# Patient Record
Sex: MALE | Race: OTHER | HISPANIC OR LATINO
[De-identification: names, ages, dates, MRNs, and addresses within clinical notes are randomized per-mention and may not be internally consistent; named-entity substitution may affect disease eponyms.]

---

## 2017-08-23 ENCOUNTER — RECORD ABSTRACTING (OUTPATIENT)
Age: 62
End: 2017-08-23

## 2017-08-23 PROBLEM — Z00.00 ENCOUNTER FOR PREVENTIVE HEALTH EXAMINATION: Status: ACTIVE | Noted: 2017-08-23

## 2017-08-24 ENCOUNTER — INPATIENT (INPATIENT)
Facility: HOSPITAL | Age: 62
LOS: 3 days | Discharge: HOME | DRG: 147 | End: 2017-08-28
Attending: OTOLARYNGOLOGY | Admitting: OTOLARYNGOLOGY
Payer: COMMERCIAL

## 2017-08-24 VITALS
RESPIRATION RATE: 18 BRPM | TEMPERATURE: 99 F | SYSTOLIC BLOOD PRESSURE: 135 MMHG | OXYGEN SATURATION: 100 % | HEART RATE: 85 BPM | DIASTOLIC BLOOD PRESSURE: 89 MMHG

## 2017-08-24 LAB
ALBUMIN SERPL ELPH-MCNC: 4.4 G/DL — SIGNIFICANT CHANGE UP (ref 3.3–5)
ALP SERPL-CCNC: 128 U/L — HIGH (ref 40–120)
ALT FLD-CCNC: 12 U/L — SIGNIFICANT CHANGE UP (ref 10–45)
ANION GAP SERPL CALC-SCNC: 14 MMOL/L — SIGNIFICANT CHANGE UP (ref 5–17)
AST SERPL-CCNC: 16 U/L — SIGNIFICANT CHANGE UP (ref 10–40)
BASOPHILS NFR BLD AUTO: 0.4 % — SIGNIFICANT CHANGE UP (ref 0–2)
BILIRUB SERPL-MCNC: 0.4 MG/DL — SIGNIFICANT CHANGE UP (ref 0.2–1.2)
BUN SERPL-MCNC: 17 MG/DL — SIGNIFICANT CHANGE UP (ref 7–23)
CALCIUM SERPL-MCNC: 9.6 MG/DL — SIGNIFICANT CHANGE UP (ref 8.4–10.5)
CHLORIDE SERPL-SCNC: 102 MMOL/L — SIGNIFICANT CHANGE UP (ref 96–108)
CO2 SERPL-SCNC: 25 MMOL/L — SIGNIFICANT CHANGE UP (ref 22–31)
CREAT SERPL-MCNC: 0.9 MG/DL — SIGNIFICANT CHANGE UP (ref 0.5–1.3)
EOSINOPHIL NFR BLD AUTO: 5 % — SIGNIFICANT CHANGE UP (ref 0–6)
GLUCOSE SERPL-MCNC: 123 MG/DL — HIGH (ref 70–99)
HCT VFR BLD CALC: 45.5 % — SIGNIFICANT CHANGE UP (ref 39–50)
HGB BLD-MCNC: 15.4 G/DL — SIGNIFICANT CHANGE UP (ref 13–17)
INR BLD: 1.26 — HIGH (ref 0.88–1.16)
LYMPHOCYTES # BLD AUTO: 20.3 % — SIGNIFICANT CHANGE UP (ref 13–44)
MAGNESIUM SERPL-MCNC: 2.1 MG/DL — SIGNIFICANT CHANGE UP (ref 1.6–2.6)
MCHC RBC-ENTMCNC: 30.4 PG — SIGNIFICANT CHANGE UP (ref 27–34)
MCHC RBC-ENTMCNC: 33.8 G/DL — SIGNIFICANT CHANGE UP (ref 32–36)
MCV RBC AUTO: 89.7 FL — SIGNIFICANT CHANGE UP (ref 80–100)
MONOCYTES NFR BLD AUTO: 6.2 % — SIGNIFICANT CHANGE UP (ref 2–14)
NEUTROPHILS NFR BLD AUTO: 68.1 % — SIGNIFICANT CHANGE UP (ref 43–77)
PHOSPHATE SERPL-MCNC: 3.6 MG/DL — SIGNIFICANT CHANGE UP (ref 2.5–4.5)
PLATELET # BLD AUTO: 220 K/UL — SIGNIFICANT CHANGE UP (ref 150–400)
POTASSIUM SERPL-MCNC: 4.1 MMOL/L — SIGNIFICANT CHANGE UP (ref 3.5–5.3)
POTASSIUM SERPL-SCNC: 4.1 MMOL/L — SIGNIFICANT CHANGE UP (ref 3.5–5.3)
PROT SERPL-MCNC: 8 G/DL — SIGNIFICANT CHANGE UP (ref 6–8.3)
PROTHROM AB SERPL-ACNC: 14 SEC — HIGH (ref 9.8–12.7)
RBC # BLD: 5.07 M/UL — SIGNIFICANT CHANGE UP (ref 4.2–5.8)
RBC # FLD: 13.7 % — SIGNIFICANT CHANGE UP (ref 10.3–16.9)
SODIUM SERPL-SCNC: 141 MMOL/L — SIGNIFICANT CHANGE UP (ref 135–145)
WBC # BLD: 9.3 K/UL — SIGNIFICANT CHANGE UP (ref 3.8–10.5)
WBC # FLD AUTO: 9.3 K/UL — SIGNIFICANT CHANGE UP (ref 3.8–10.5)

## 2017-08-24 PROCEDURE — 71010: CPT | Mod: 26

## 2017-08-24 PROCEDURE — 99254 IP/OBS CNSLTJ NEW/EST MOD 60: CPT | Mod: GC

## 2017-08-24 PROCEDURE — 93010 ELECTROCARDIOGRAM REPORT: CPT

## 2017-08-24 RX ORDER — ONDANSETRON 8 MG/1
4 TABLET, FILM COATED ORAL EVERY 6 HOURS
Qty: 0 | Refills: 0 | Status: DISCONTINUED | OUTPATIENT
Start: 2017-08-24 | End: 2017-08-28

## 2017-08-24 RX ORDER — ACETAMINOPHEN 500 MG
650 TABLET ORAL EVERY 6 HOURS
Qty: 0 | Refills: 0 | Status: DISCONTINUED | OUTPATIENT
Start: 2017-08-24 | End: 2017-08-25

## 2017-08-24 RX ORDER — NICOTINE POLACRILEX 2 MG
1 GUM BUCCAL DAILY
Qty: 0 | Refills: 0 | Status: DISCONTINUED | OUTPATIENT
Start: 2017-08-24 | End: 2017-08-24

## 2017-08-24 RX ORDER — SODIUM CHLORIDE 9 MG/ML
1000 INJECTION, SOLUTION INTRAVENOUS
Qty: 0 | Refills: 0 | Status: DISCONTINUED | OUTPATIENT
Start: 2017-08-24 | End: 2017-08-26

## 2017-08-24 RX ORDER — NICOTINE POLACRILEX 2 MG
1 GUM BUCCAL DAILY
Qty: 0 | Refills: 0 | Status: DISCONTINUED | OUTPATIENT
Start: 2017-08-24 | End: 2017-08-28

## 2017-08-24 RX ORDER — HEPARIN SODIUM 5000 [USP'U]/ML
5000 INJECTION INTRAVENOUS; SUBCUTANEOUS EVERY 8 HOURS
Qty: 0 | Refills: 0 | Status: DISCONTINUED | OUTPATIENT
Start: 2017-08-24 | End: 2017-08-24

## 2017-08-24 RX ADMIN — Medication 1 PATCH: at 19:08

## 2017-08-24 NOTE — H&P ADULT - ATTENDING COMMENTS
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  He has narrow glottic inlet, so plan to do microlaryngoscopy and biopsies to map primary and open up glottic inlet by debulking tumor.  Diagnosis already established by outside open biopsy of left upper jugular LN 2 weeks ago.  He is also to have PEG since not able to drink much.  Treatment recommendations will depend on operative findings and CT neck.

## 2017-08-24 NOTE — H&P ADULT - HISTORY OF PRESENT ILLNESS
HPI: 62M who initially presented with throat pain for about a month which he attributed to a viral pharyngitis at first.  His symptoms then progressed to persistent throat pain, dysphagia, decreased PO tolerance, dysphonia, and left sided otalgia.  He also noticed a left sided neck mass which prompted his to go to [facility].  He underwent an incisional biopsy at outside hospital which was positive for SCC and subsequent PET CT which showed left piriform sinus mass w/ increased uptake as well as increased uptake in left nasopharynx, left palatine tonsil and left cervical LN.  At this time he was referred to Dr. Martinez who recommended inpatient admission, DL/biopsy, IV hydration and possibel PEG.  Since his biopsy he endorses worsening PO tolerance and now daily otalgia.  No dyspnea/SOB, cough, or hemoptysis.       Allergies    No Known Allergies    Intolerances      SOCIAL HISTORY:  Tobacco History: 4 PPD smoker x40 years  ETOH Use: 6-pack of beers/day, does not remember the last time he has not had a drink, last drink today at 10am    FAMILY HISTORY:    Pain medications/Neuro:  acetaminophen    Suspension. 650 milliGRAM(s) Oral every 6 hours PRN  ondansetron Injectable 4 milliGRAM(s) IV Push every 6 hours PRN  LORazepam    IVPB 2 milliGRAM(s) IV Intermittent every 4 hours PRN      IV fluids:  lactated ringers. 1000 milliLiter(s) IV Continuous <Continuous>          All other standing medications:   nicotine - 21 mG/24Hr(s) Patch 1 patch Transdermal daily      All other PRN medications:      Vital Signs Last 24 Hrs  T(C): 37.1 (24 Aug 2017 17:07), Max: 37.1 (24 Aug 2017 17:07)  T(F): 98.7 (24 Aug 2017 17:07), Max: 98.7 (24 Aug 2017 17:07)  HR: 74 (24 Aug 2017 17:07) (74 - 85)  BP: 113/77 (24 Aug 2017 17:07) (113/77 - 135/89)  BP(mean): --  RR: 18 (24 Aug 2017 17:07) (18 - 18)  SpO2: 97% (24 Aug 2017 17:07) (97% - 100%)    LABS: pending          PHYSICAL EXAM:  Gen: NAD, alert and responsive, pleasant, no evidence of tremors  Resp: non-labored breathing on RA, no stridor/tachypnea/retractions, +hoarseness but phonation intact, tolerating secretions  Nose: clear anteriorly  OC/OP: healthy appearing mucoous membranes  Neck: supple, trachea midline, left neck with  incision in level II, c/d/i, with steri-strips, no other palpable LN, no overlying skin changes, non-tender      RADIOLOGY & ADDITIONAL STUDIES: reveiwed      Assessment/Plan:  62y Male w/ 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 Juan Davis ENT at [phone] with any questions/concerns. HPI: 62M who initially presented with throat pain for about a month which he attributed to a viral pharyngitis at first.  His symptoms then progressed to persistent throat pain, dysphagia, decreased PO tolerance, dysphonia, and left sided otalgia.  He also noticed a left sided neck mass which prompted his to go to [facility].  He underwent an incisional biopsy at outside hospital which was positive for SCC and subsequent PET CT which showed left piriform sinus mass w/ increased uptake as well as increased uptake in left nasopharynx, left palatine tonsil and left cervical LN.  At this time he was referred to Dr. Martinez who recommended inpatient admission, DL/biopsy, IV hydration and possibel PEG.  Since his biopsy he endorses worsening PO tolerance and now daily otalgia.  No dyspnea/SOB, cough, or hemoptysis.       Allergies    No Known Allergies    Intolerances      SOCIAL HISTORY:  Tobacco History: 4 PPD smoker x40 years  ETOH Use: 6-pack of beers/day, does not remember the last time he has not had a drink, last drink today at 10am    FAMILY HISTORY:    Pain medications/Neuro:  acetaminophen    Suspension. 650 milliGRAM(s) Oral every 6 hours PRN  ondansetron Injectable 4 milliGRAM(s) IV Push every 6 hours PRN  LORazepam    IVPB 2 milliGRAM(s) IV Intermittent every 4 hours PRN      IV fluids:  lactated ringers. 1000 milliLiter(s) IV Continuous <Continuous>          All other standing medications:   nicotine - 21 mG/24Hr(s) Patch 1 patch Transdermal daily      All other PRN medications:      Vital Signs Last 24 Hrs  T(C): 37.1 (24 Aug 2017 17:07), Max: 37.1 (24 Aug 2017 17:07)  T(F): 98.7 (24 Aug 2017 17:07), Max: 98.7 (24 Aug 2017 17:07)  HR: 74 (24 Aug 2017 17:07) (74 - 85)  BP: 113/77 (24 Aug 2017 17:07) (113/77 - 135/89)  BP(mean): --  RR: 18 (24 Aug 2017 17:07) (18 - 18)  SpO2: 97% (24 Aug 2017 17:07) (97% - 100%)    LABS: pending          PHYSICAL EXAM:  Gen: NAD, alert and responsive, pleasant, no evidence of tremors  Resp: non-labored breathing on RA, no stridor/tachypnea/retractions, +hoarseness but phonation intact, tolerating secretions  Nose: clear anteriorly  OC/OP: no visible tonsillar tissue, upper dentures otherwise healthy appearing mucous membranes, symmetric palatal rise, posterior OP wall visible  Neck: supple, trachea midline, left neck with  incision in level II, c/d/i, with steri-strips, two discrete left sided level IIa and IIb LN, no other palpable LAD, no overlying skin changes, non-tender    LARYNGOSCOPY EXAM:     -Verbal consent was obtained from patient prior to procedure.    Indication:    Anesthesia: Afrin and lidocaine spray was applied to the nasal cavities.    Flexible laryngoscopy was performed and revealed the following:    -- Nasopharynx had no mass or exudate.    -- Base of tongue was symmetric and not enlarged.    -- Vallecula was clear    -- Epiglottis    --Left piriform mass extending to L FVF, TVF and proximal AE fold    --Left TVC non-mobile, paramedian position, RTV fully mobile    -- Arytenoids both without edema and erythema     -- Post cricoid area was clear.    -- Interarytenoid edema was absent    The patient tolerated the procedure well.      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 Juan Davis ENT at [phone] with any questions/concerns. HPI: Mr. Larose is a 62M who initially presented with throat pain for about a month which he attributed to a viral pharyngitis at first.  His symptoms then progressed to persistent throat pain, dysphagia, decreased PO tolerance, dysphonia, and left sided otalgia.  He also noticed a left sided neck mass which prompted his to go a general surgeon.  He underwent an incisional biopsy at outside hospital which was positive for metastatic SCC.  Subsequent MRI neck and a  PET CT showed left piriform sinus mass w/ increased uptake as well as increased uptake in left nasopharynx, left palatine tonsil and left cervical LN.    At this time he was referred to Dr. Martinez for increasing difficulty swallowing.  Daughter reported that he was barely able to drink liquids in few days prior to admission.  Direct inpatient admission was arranged for IV hydration and PEG.  Since his neck biopsy, he endorses worsening PO tolerance and now daily otalgia.    No dyspnea/SOB, cough, or hemoptysis.       Allergies  No Known Allergies    SOCIAL HISTORY:  Tobacco History: 4 PPD smoker x40 years  ETOH Use: 6-pack of beers/day, does not remember the last time he has not had a drink, last drink today at 10am    FAMILY HISTORY:  Throat cancer in mother    MEDICATIONS  Pain medications/Neuro:  acetaminophen    Suspension. 650 milliGRAM(s) Oral every 6 hours PRN  ondansetron Injectable 4 milliGRAM(s) IV Push every 6 hours PRN  LORazepam    IVPB 2 milliGRAM(s) IV Intermittent every 4 hours PRN      IV fluids:  lactated ringers. 1000 milliLiter(s) IV Continuous <Continuous>          All other standing medications:   nicotine - 21 mG/24Hr(s) Patch 1 patch Transdermal daily      All other PRN medications:      Vital Signs Last 24 Hrs  T(C): 37.1 (24 Aug 2017 17:07), Max: 37.1 (24 Aug 2017 17:07)  T(F): 98.7 (24 Aug 2017 17:07), Max: 98.7 (24 Aug 2017 17:07)  HR: 74 (24 Aug 2017 17:07) (74 - 85)  BP: 113/77 (24 Aug 2017 17:07) (113/77 - 135/89)  BP(mean): --  RR: 18 (24 Aug 2017 17:07) (18 - 18)  SpO2: 97% (24 Aug 2017 17:07) (97% - 100%)    LABS: pending          PHYSICAL EXAM:  Gen: NAD, alert and responsive, pleasant, no evidence of tremors  Resp: non-labored breathing on RA, no stridor/tachypnea/retractions, +hoarseness but phonation intact, tolerating secretions  Nose: clear anteriorly  OC/OP: no visible tonsillar tissue, upper dentures otherwise healthy appearing mucous membranes, symmetric palatal rise, posterior OP wall visible  Neck: supple, trachea midline, left neck with  incision in level II, c/d/i, with steri-strips, two discrete left sided level IIa and IIb LN, no other palpable LAD, no overlying skin changes, non-tender    LARYNGOSCOPY EXAM:     -Verbal consent was obtained from patient prior to procedure.    Indication:    Anesthesia: Afrin and lidocaine spray was applied to the nasal cavities.    Flexible laryngoscopy was performed and revealed the following:    -- Nasopharynx had no mass or exudate.    -- Base of tongue was symmetric and not enlarged.    -- Vallecula was clear    -- Epiglottis    --Left piriform mass extending to L FVF, TVF and proximal AE fold    --Left TVC non-mobile, paramedian position, RTV fully mobile    -- Arytenoids both without edema and erythema     -- Post cricoid area was clear.    -- Interarytenoid edema was absent    The patient tolerated the procedure well.      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 Juan Davis ENT at [phone] with any questions/concerns.

## 2017-08-24 NOTE — CONSULT NOTE ADULT - SUBJECTIVE AND OBJECTIVE BOX
Patient is a 62y old  Male who presents with a chief complaint of trouble swallowing.     Mr. Larose is a 62 yr old male daily smoker and alcohol abuser with PMHx metastatic SCC of the neck who presents with trouble swallowing. Pt says 3 months ago he began to have a sore throat with some trouble swallowing. He thought it was a regular cold but it got worse. He then began to feel a mass on the left side of his neck, which he also attributed to a virus. Eventually he did seek treatment and had a biopsy done. The biopsy showed metastatic SCC of the neck (the biopsy was of a lymph node). He also had a MRI and PET which showed tumor burden in his neck. Pt c/p trouble swallowing solids and liquids. He can walk an unlimited number of upstairs w/o symptoms. He has no nightly cardio/pulm sx except for mild snoring. He was never hospitalized for his drinking or w/d.   Pt denies fever chills sob cp n/v/d.     INTERVAL HPI/OVERNIGHT EVENTS:  T(C): 37 (08-24-17 @ 14:46), Max: 37 (08-24-17 @ 14:46)  HR: 85 (08-24-17 @ 14:46) (85 - 85)  BP: 135/89 (08-24-17 @ 14:46) (135/89 - 135/89)  RR: 18 (08-24-17 @ 14:46) (18 - 18)  SpO2: 100% (08-24-17 @ 14:46) (100% - 100%)  Wt(kg): --  I&O's Summary      PAST MEDICAL & SURGICAL HISTORY:  SCC of neck  etoh abuse      SOCIAL HISTORY  Alcohol: daily drinker -  pack of beer for years  Tobacco: daily smoker formerly 4 packs per day for 30 yrs now down to 1 pack per day)  Illicit substance use: n/a      FAMILY HISTORY:    mom: throat cancer    MEDICATIONS  (STANDING):  heparin  Injectable 5000 Unit(s) SubCutaneous every 8 hours  lactated ringers. 1000 milliLiter(s) (75 mL/Hr) IV Continuous <Continuous>  nicotine -  14 mG/24Hr(s) Patch 1 Patch Transdermal daily    MEDICATIONS  (PRN):  acetaminophen    Suspension. 650 milliGRAM(s) Oral every 6 hours PRN Mild Pain (1 - 3)  ondansetron Injectable 4 milliGRAM(s) IV Push every 6 hours PRN Nausea      REVIEW OF SYSTEMS:  CONSTITUTIONAL: No fever, weight loss, or fatigue  EYES: No eye pain, visual disturbances, or discharge  ENMT:  No difficulty hearing, tinnitus, vertigo; + throat pain  NECK: No pain or stiffness  RESPIRATORY: No cough, wheezing, chills or hemoptysis; No shortness of breath  CARDIOVASCULAR: No chest pain, palpitations, dizziness, or leg swelling  GASTROINTESTINAL: No abdominal or epigastric pain. No nausea, vomiting, or hematemesis; No diarrhea or constipation. No melena or hematochezia.  GENITOURINARY: No dysuria, frequency, hematuria, or incontinence  NEUROLOGICAL: No headaches, memory loss, loss of strength, numbness, or tremors  SKIN: No itching, burning, rashes, or lesions   LYMPH NODES: enlarged gland on left submandibular area  ENDOCRINE: No heat or cold intolerance; No hair loss  MUSCULOSKELETAL: No joint pain or swelling; No muscle, back, or extremity pain  PSYCHIATRIC: + anxiety, No depression, mood swings, or difficulty sleeping  HEME/LYMPH: No easy bruising, or bleeding gums  ALLERY AND IMMUNOLOGIC: No hives or eczema    RADIOLOGY & ADDITIONAL TESTS:    Imaging Personally Reviewed:  [x ] YES  [ ] NO    Consultant(s) Notes Reviewed:  [ ] YES  [ ] NO    PHYSICAL EXAM:  GENERAL: NAD, well-groomed, well-developed. anxious.   HEAD:  Atraumatic, Normocephalic  EYES: EOMI, PERRLA, conjunctiva and sclera clear  ENMT: No tonsillar erythema, exudates, or enlargement; Moist mucous membranes, Good dentition, No lesions.  NECK: hard palpable node left submandibular area s/p biopsy with dressing seen.   NERVOUS SYSTEM:  Alert & Oriented X3, Good concentration; Motor Strength 5/5 B/L upper and lower extremities; DTRs 2+ intact and symmetric    CIWA score 1    CHEST/LUNG: Clear to percussion bilaterally; No rales, rhonchi, wheezing, or rubs  HEART: Regular rate and rhythm; No murmurs, rubs, or gallops  ABDOMEN: Soft, Nontender, Nondistended; Bowel sounds present  EXTREMITIES:  2+ Peripheral Pulses, No clubbing, cyanosis, or edema  LYMPH: hard palpable node left submandibular area s/p biopsy with dressing seen.   SKIN: No rashes or lesions    a/p: 62 yr old male daily smoker and alcohol abuser with PMHx metastatic SCC of the neck who p/w worsening dysphagia and odynophagia likely from increased tumor burden. medicine consulted for pre op clearance and etoh w/d.     preop - pt to go to [facility] tmrw [facility] with ENT and gen surg for direct laryngoscopy with biopsies and poss troch and poss PEG. to go under general anesthesia. no cardiac disease. METS > 4 and RCRI 0.   low risk patient for low risk procedure.       etoh w/d - pt is a daily drinker (last drink 10 am today) with no hx of hospitalizations or DTs. currently he is not showing s/s of etoh w/d and his CIWA is 1. We expect that as his etoh blood level drops he will show more symptoms. therefore, we recommend:  - ativan 2 mg prn ciwa > 8  - if need to give ativan consistently can start librium    medicine will follow    case d/w Dr. Umanzor Patient is a 62y old  Male who presents with a chief complaint of trouble swallowing.     Mr. Larose is a 62 yr old male daily smoker and alcohol abuser with PMHx metastatic SCC of the neck who presents with trouble swallowing. Pt says 3 months ago he began to have a sore throat with some trouble swallowing. He thought it was a regular cold but it got worse. He then began to feel a mass on the left side of his neck, which he also attributed to a virus. Eventually he did seek treatment and had a biopsy done. The biopsy showed metastatic SCC of the neck (the biopsy was of a lymph node). He also had a MRI and PET which showed tumor burden in his neck. Pt c/p trouble swallowing solids and liquids. He can walk an unlimited number of upstairs w/o symptoms. He has no nightly cardio/pulm sx except for mild snoring. He was never hospitalized for his drinking or w/d.   Pt denies fever chills sob cp n/v/d.     INTERVAL HPI/OVERNIGHT EVENTS:  T(C): 37 (08-24-17 @ 14:46), Max: 37 (08-24-17 @ 14:46)  HR: 85 (08-24-17 @ 14:46) (85 - 85)  BP: 135/89 (08-24-17 @ 14:46) (135/89 - 135/89)  RR: 18 (08-24-17 @ 14:46) (18 - 18)  SpO2: 100% (08-24-17 @ 14:46) (100% - 100%)  Wt(kg): --  I&O's Summary      PAST MEDICAL & SURGICAL HISTORY:  SCC of neck  etoh abuse      SOCIAL HISTORY  Alcohol: daily drinker -  pack of beer for years  Tobacco: daily smoker formerly 4 packs per day for 30 yrs now down to 1 pack per day)  Illicit substance use: n/a      FAMILY HISTORY:    mom: throat cancer    MEDICATIONS  (STANDING):  heparin  Injectable 5000 Unit(s) SubCutaneous every 8 hours  lactated ringers. 1000 milliLiter(s) (75 mL/Hr) IV Continuous <Continuous>  nicotine -  14 mG/24Hr(s) Patch 1 Patch Transdermal daily    MEDICATIONS  (PRN):  acetaminophen    Suspension. 650 milliGRAM(s) Oral every 6 hours PRN Mild Pain (1 - 3)  ondansetron Injectable 4 milliGRAM(s) IV Push every 6 hours PRN Nausea      REVIEW OF SYSTEMS:  CONSTITUTIONAL: No fever, weight loss, or fatigue  EYES: No eye pain, visual disturbances, or discharge  ENMT:  No difficulty hearing, tinnitus, vertigo; + throat pain  NECK: No pain or stiffness  RESPIRATORY: No cough, wheezing, chills or hemoptysis; No shortness of breath  CARDIOVASCULAR: No chest pain, palpitations, dizziness, or leg swelling  GASTROINTESTINAL: No abdominal or epigastric pain. No nausea, vomiting, or hematemesis; No diarrhea or constipation. No melena or hematochezia.  GENITOURINARY: No dysuria, frequency, hematuria, or incontinence  NEUROLOGICAL: No headaches, memory loss, loss of strength, numbness, or tremors  SKIN: No itching, burning, rashes, or lesions   LYMPH NODES: enlarged gland on left submandibular area  ENDOCRINE: No heat or cold intolerance; No hair loss  MUSCULOSKELETAL: No joint pain or swelling; No muscle, back, or extremity pain  PSYCHIATRIC: + anxiety, No depression, mood swings, or difficulty sleeping  HEME/LYMPH: No easy bruising, or bleeding gums  ALLERY AND IMMUNOLOGIC: No hives or eczema    RADIOLOGY & ADDITIONAL TESTS:    Imaging Personally Reviewed:  [x ] YES  [ ] NO    Consultant(s) Notes Reviewed:  [ ] YES  [ ] NO    PHYSICAL EXAM:  GENERAL: NAD, well-groomed, well-developed. anxious.   HEAD:  Atraumatic, Normocephalic  EYES: EOMI, PERRLA, conjunctiva and sclera clear  ENMT: No tonsillar erythema, exudates, or enlargement; Moist mucous membranes, Good dentition, No lesions.  NECK: hard palpable node left submandibular area s/p biopsy with dressing seen.   NERVOUS SYSTEM:  Alert & Oriented X3, Good concentration; Motor Strength 5/5 B/L upper and lower extremities; DTRs 2+ intact and symmetric    CIWA score 1    CHEST/LUNG: Clear to percussion bilaterally; No rales, rhonchi, wheezing, or rubs  HEART: Regular rate and rhythm; No murmurs, rubs, or gallops  ABDOMEN: Soft, Nontender, Nondistended; Bowel sounds present  EXTREMITIES:  2+ Peripheral Pulses, No clubbing, cyanosis, or edema  LYMPH: hard palpable node left submandibular area s/p biopsy with dressing seen.   SKIN: No rashes or lesions    a/p: 62 yr old male daily smoker and alcohol abuser with PMHx metastatic SCC of the neck who p/w worsening dysphagia and odynophagia likely from increased tumor burden. medicine consulted for pre op clearance and etoh w/d.     preop - pt to go to OR tmrw PM with ENT and gen surg for direct laryngoscopy with biopsies and poss troch and poss PEG. to go under general anesthesia. no cardiac disease. METS > 4 and RCRI 0.   low risk patient for low risk procedure.   - obtain CBC CMP and EKG pre op  - cxr done as o/p wnl  Pt is OK to proceed with surgery.       etoh w/d - pt is a daily drinker (last drink 10 am today) with no hx of hospitalizations or DTs. currently he is not showing s/s of etoh w/d and his CIWA is 1. We expect that as his etoh blood level drops (or after surgery) he may show more symptoms. therefore, we recommend:  - ativan 2 mg prn q4h for ciwa > 8 OR s/s of etoh w/d (anxiety, agitation)  - may consider starting librium post op    smoking hx - Nicotine patch 21 mg    medicine will follow    case d/w Dr. Umanzor

## 2017-08-25 ENCOUNTER — RESULT REVIEW (OUTPATIENT)
Age: 62
End: 2017-08-25

## 2017-08-25 DIAGNOSIS — F17.200 NICOTINE DEPENDENCE, UNSPECIFIED, UNCOMPLICATED: ICD-10-CM

## 2017-08-25 DIAGNOSIS — C79.9 SECONDARY MALIGNANT NEOPLASM OF UNSPECIFIED SITE: ICD-10-CM

## 2017-08-25 DIAGNOSIS — Z01.818 ENCOUNTER FOR OTHER PREPROCEDURAL EXAMINATION: ICD-10-CM

## 2017-08-25 DIAGNOSIS — F10.10 ALCOHOL ABUSE, UNCOMPLICATED: ICD-10-CM

## 2017-08-25 PROCEDURE — 99233 SBSQ HOSP IP/OBS HIGH 50: CPT

## 2017-08-25 PROCEDURE — 70491 CT SOFT TISSUE NECK W/DYE: CPT | Mod: 26

## 2017-08-25 PROCEDURE — 31536 LARYNGOSCOPY W/BX & OP SCOPE: CPT

## 2017-08-25 RX ORDER — BENZOCAINE AND MENTHOL 5; 1 G/100ML; G/100ML
1 LIQUID ORAL EVERY 4 HOURS
Qty: 0 | Refills: 0 | Status: DISCONTINUED | OUTPATIENT
Start: 2017-08-25 | End: 2017-08-28

## 2017-08-25 RX ORDER — FOLIC ACID 0.8 MG
1 TABLET ORAL DAILY
Qty: 0 | Refills: 0 | Status: DISCONTINUED | OUTPATIENT
Start: 2017-08-25 | End: 2017-08-28

## 2017-08-25 RX ORDER — ACETAMINOPHEN 500 MG
650 TABLET ORAL EVERY 6 HOURS
Qty: 0 | Refills: 0 | Status: DISCONTINUED | OUTPATIENT
Start: 2017-08-25 | End: 2017-08-28

## 2017-08-25 RX ORDER — HEPARIN SODIUM 5000 [USP'U]/ML
5000 INJECTION INTRAVENOUS; SUBCUTANEOUS EVERY 8 HOURS
Qty: 0 | Refills: 0 | Status: DISCONTINUED | OUTPATIENT
Start: 2017-08-26 | End: 2017-08-28

## 2017-08-25 RX ORDER — HYDROMORPHONE HYDROCHLORIDE 2 MG/ML
0.2 INJECTION INTRAMUSCULAR; INTRAVENOUS; SUBCUTANEOUS ONCE
Qty: 0 | Refills: 0 | Status: DISCONTINUED | OUTPATIENT
Start: 2017-08-25 | End: 2017-08-25

## 2017-08-25 RX ORDER — THIAMINE MONONITRATE (VIT B1) 100 MG
100 TABLET ORAL DAILY
Qty: 0 | Refills: 0 | Status: DISCONTINUED | OUTPATIENT
Start: 2017-08-25 | End: 2017-08-28

## 2017-08-25 RX ORDER — OXYCODONE HYDROCHLORIDE 5 MG/1
5 TABLET ORAL EVERY 4 HOURS
Qty: 0 | Refills: 0 | Status: DISCONTINUED | OUTPATIENT
Start: 2017-08-25 | End: 2017-08-25

## 2017-08-25 RX ORDER — SENNA PLUS 8.6 MG/1
10 TABLET ORAL
Qty: 0 | Refills: 0 | Status: DISCONTINUED | OUTPATIENT
Start: 2017-08-25 | End: 2017-08-28

## 2017-08-25 RX ORDER — HYDROMORPHONE HYDROCHLORIDE 2 MG/ML
0.5 INJECTION INTRAMUSCULAR; INTRAVENOUS; SUBCUTANEOUS EVERY 4 HOURS
Qty: 0 | Refills: 0 | Status: DISCONTINUED | OUTPATIENT
Start: 2017-08-25 | End: 2017-08-28

## 2017-08-25 RX ORDER — OXYCODONE HYDROCHLORIDE 5 MG/1
5 TABLET ORAL EVERY 4 HOURS
Qty: 0 | Refills: 0 | Status: DISCONTINUED | OUTPATIENT
Start: 2017-08-25 | End: 2017-08-28

## 2017-08-25 RX ADMIN — Medication 1 PATCH: at 23:23

## 2017-08-25 RX ADMIN — Medication 1 PATCH: at 23:24

## 2017-08-25 RX ADMIN — HYDROMORPHONE HYDROCHLORIDE 0.2 MILLIGRAM(S): 2 INJECTION INTRAMUSCULAR; INTRAVENOUS; SUBCUTANEOUS at 14:50

## 2017-08-25 NOTE — PROGRESS NOTE ADULT - SUBJECTIVE AND OBJECTIVE BOX
Patient is a 62y old  Male who presents with a chief complaint of     INTERVAL HPI/OVERNIGHT EVENTS:  Seen and examined by me this morning. Awaiting procedures. Minimally anxious and not showing any e/o WD. Family at bedside. Stated not wanting to have a tube on his neck. Minimal craving to smoke. Didn't use any ativan overnight.    Review of Systems: 12 point review of systems otherwise negative    MEDICATIONS  (STANDING):  lactated ringers. 1000 milliLiter(s) (75 mL/Hr) IV Continuous <Continuous>  nicotine - 21 mG/24Hr(s) Patch 1 patch Transdermal daily  thiamine 100 milliGRAM(s) Enteral Tube daily  folic acid 1 milliGRAM(s) Enteral Tube daily  multivitamin  Chewable 1 Tablet(s) Chew daily    MEDICATIONS  (PRN):  acetaminophen    Suspension. 650 milliGRAM(s) Oral every 6 hours PRN Mild Pain (1 - 3)  ondansetron Injectable 4 milliGRAM(s) IV Push every 6 hours PRN Nausea  LORazepam    IVPB 2 milliGRAM(s) IV Intermittent every 4 hours PRN Agitation/axiety, CIWA>8      Allergies    No Known Allergies    Intolerances          Vital Signs Last 24 Hrs  T(C): 37.1 (25 Aug 2017 16:30), Max: 37.1 (25 Aug 2017 16:30)  T(F): 98.7 (25 Aug 2017 16:30), Max: 98.7 (25 Aug 2017 16:30)  HR: 73 (25 Aug 2017 16:09) (64 - 78)  BP: 166/103 (25 Aug 2017 16:09) (124/79 - 166/103)  BP(mean): 97 (25 Aug 2017 01:06) (92 - 101)  RR: 17 (25 Aug 2017 16:09) (16 - 18)  SpO2: 96% (25 Aug 2017 16:09) (96% - 99%)  CAPILLARY BLOOD GLUCOSE          08- @ :  -   @ 07:00  --------------------------------------------------------  IN: 675 mL / OUT: 200 mL / NET: 475 mL     @ 07:  -   @ 18:28  --------------------------------------------------------  IN: 300 mL / OUT: 350 mL / NET: -50 mL        Physical Exam:    Daily Height in cm: 175.26 (25 Aug 2017 06:55)    Daily Weight in k.5 (25 Aug 2017 14:43)  General:  Well appearing, NAD, not cachetic, no tremors, minimally anxious  HEENT:  Nonicteric, PERRLA, hardening on left side of neck  CV:  RRR, no murmur, no JVD  Lungs:  CTA B/L, no wheezes, rales, rhonchi  Abdomen:  Soft, non-tender, no distended, positive BS, no hepatosplenomegaly  Extremities:  2+ pulses, no c/c, no edema  Skin:  Warm and dry, no rashes  :  No lira  Neuro:  AAOx3, non-focal, CN II-XII grossly intact  No Restraints    LABS:                        15.4   9.3   )-----------( 220      ( 24 Aug 2017 18:20 )             45.5     08-    141  |  102  |  17  ----------------------------<  123<H>  4.1   |  25  |  0.90    Ca    9.6      24 Aug 2017 18:19  Phos  3.6     08-  Mg     2.1     -24    TPro  8.0  /  Alb  4.4  /  TBili  0.4  /  DBili  x   /  AST  16  /  ALT  12  /  AlkPhos  128<H>  08-24    PT/INR - ( 24 Aug 2017 18:19 )   PT: 14.0 sec;   INR: 1.26                  RADIOLOGY & ADDITIONAL TESTS:    ---------------------------------------------------------------------------

## 2017-08-25 NOTE — BRIEF OPERATIVE NOTE - POST-OP DX
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

## 2017-08-25 NOTE — PROGRESS NOTE ADULT - SUBJECTIVE AND OBJECTIVE BOX
ENT Preop Note    Procedure: direct laryngoscopy, bronchoscopy, esophagoscopy with biopsy, possible tracheostomy  Indication: hyopharyngeal mass, +metastatic SCC of neck    A/P: 61 yo M with biopsy confirmed metastatic SCC of the neck with hyopharyngeal mass for above named procedure today.  -added on for OR today  -NPO, IVF  -preop labs, ekg, cxr reviewed (Cxr read pending)  -CTN with contrast today  -consent obtained and in chart  -anesthesia consent pending

## 2017-08-25 NOTE — DIETITIAN INITIAL EVALUATION ADULT. - NS AS NUTRI INTERV ENTERAL NUTRITION
As medically feasible s/p PEG placement, rec Jevity 1.2 - initiate at 30ml/hr, increasing by 10ml Q 4 hrs, to goal rate of 75ml/hr cont x24 hrs (2160kcal, 99g pro, 1458ml water).  Consider 600-950cc free water to best meet hydration needs.

## 2017-08-25 NOTE — PROGRESS NOTE ADULT - PROBLEM SELECTOR PLAN 1
RCRI: 0, Mets >4. Combined clinical and surgical risk is low. Low/intermediate risk procedures: PEG, direct laryngoscopy, bronchoscopy, esophagoscopy with biopsy, possible tracheostomy. RCRI: 0, Mets >4. Combined clinical and surgical risk is low. Low/intermediate risk procedures: PEG, direct laryngoscopy, bronchoscopy, esophagoscopy with biopsy, possible tracheostomy. Rec DVT PPx post-operatively with LMWH.

## 2017-08-25 NOTE — PROGRESS NOTE ADULT - SUBJECTIVE AND OBJECTIVE BOX
Post-op check:    s/p direct laryngoscopy and biopsy    Patient tolerated the procedure well, extubated in OR without difficulty.  Saturating well in PACU, pain controlled.  No events post-operatively.    PE:  Gen: NAD, sleepy but responsive and appropriate  Resp: non-labored breathing on RA, no stridor, tachypnea or retractions  Nose: clear anteriorly  OC/OP: no gross bleeding  Neck: steri-strips removed in OR, palpable left level II nodes unchanged    A/P:  62M s/p DL and biopsy by ENT and PEG placed by GI earlier this AM.  No issues post-operatively, pain controlled, good hemostasis no respiratory difficulties  -transfer back to SDU after PACU for DT monitoring  -Ativan PRN as per medicine recs  -pain control, prn nausea  -may administer meds via PEG but no feeds until 24 hours post-procedure  -continue Nicotine patch  -cepachol drops PRN  -SCDs, oob ad aparna once anesthesia has worn off  -Call ENT with questions

## 2017-08-25 NOTE — DIETITIAN INITIAL EVALUATION ADULT. - NS FNS WEIGHT USED FOR CALC
ideal/Wt (8/25) 68.5kg; BMI 22.3; IBW 72.7kg; %IBW 94% admission/Wt (8/25) 68.5kg; BMI 22.3; IBW 72.7kg; %IBW 94%

## 2017-08-25 NOTE — PROGRESS NOTE ADULT - SUBJECTIVE AND OBJECTIVE BOX
ENT [facility] DAILY PROGRESS NOTE    S Pt seen and examined. No acute overnight events. NPO since MN last night in preparation for the OR today. No dyspnea.          Allergies    No Known Allergies    Intolerances        MEDICATIONS:  Antiinfectives:     IV fluids:  lactated ringers. 1000 milliLiter(s) IV Continuous <Continuous>    Hematologic/Anticoagulation:    Pain medications/Neuro:  acetaminophen    Suspension. 650 milliGRAM(s) Oral every 6 hours PRN  ondansetron Injectable 4 milliGRAM(s) IV Push every 6 hours PRN  LORazepam    IVPB 2 milliGRAM(s) IV Intermittent every 4 hours PRN    Endocrine Medications:     All other standing medications:   nicotine - 21 mG/24Hr(s) Patch 1 patch Transdermal daily    All other PRN medications:      Vital Signs Last 24 Hrs  T(C): 36.8 (25 Aug 2017 06:55), Max: 37.1 (24 Aug 2017 17:07)  T(F): 98.2 (25 Aug 2017 06:55), Max: 98.7 (24 Aug 2017 17:07)  HR: 66 (25 Aug 2017 01:06) (66 - 85)  BP: 129/78 (25 Aug 2017 06:55) (113/77 - 142/77)  BP(mean): 97 (25 Aug 2017 01:06) (92 - 101)  RR: 16 (25 Aug 2017 06:55) (16 - 18)  SpO2: 99% (25 Aug 2017 06:55) (96% - 100%)      08-24 @ 07:01  -  08-25 @ 07:00  --------------------------------------------------------  IN:    lactated ringers.: 450 mL  Total IN: 450 mL    OUT:    Voided: 200 mL  Total OUT: 200 mL    Total NET: 250 mL            PHYSICAL EXAM:    Genl NAD, breathing comfortably  Neck palpable L sided lad hard, but slightly mobile    LABS:  CBC-                        15.4   9.3   )-----------( 220      ( 24 Aug 2017 18:20 )             45.5     BMP/CMP-  24 Aug 2017 18:19    141    |  102    |  17     ----------------------------<  123    4.1     |  25     |  0.90     Ca    9.6        24 Aug 2017 18:19  Phos  3.6       24 Aug 2017 18:19  Mg     2.1       24 Aug 2017 18:19    TPro  8.0    /  Alb  4.4    /  TBili  0.4    /  DBili  x      /  AST  16     /  ALT  12     /  AlkPhos  128    24 Aug 2017 18:19    Coagulation Studies-  PT/INR - ( 24 Aug 2017 18:19 )   PT: 14.0 sec;   INR: 1.26            Endocrine Panel-  Calcium, Total Serum: 9.6 mg/dL (08-24 @ 18:19)              RADIOLOGY & ADDITIONAL STUDIES:       62y Male w/ L cervical 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 NPO in preparation for pan endo w/ bx, possible trach today.  -continue obs in step down  -NPO, IVF in preparation for OR today  -GI evaluation for concurrent peg placement  -medicine recs for DT ppx appreciated-continue ativan prn, nicotine patch  -CTN with contrast today, cxr  -preop labs, ekg reviewed      - d/w attending MD whom agrees with the above plan        PPX: SCDs, DVT ppx

## 2017-08-25 NOTE — BRIEF OPERATIVE NOTE - OPERATION/FINDINGS
supraglottic tumor centered at left false vocal fold and ventricle to epiglottis superiorly and superior border of eft TVC, did not appear to involve left TVC Supraglottic tumor centered at left false vocal fold and filling ventricle; extending to laryngeal epiglottis superiorly and just to superior border of left TVC and anterior commissure.   No piriform sinus mass/involvement.

## 2017-08-25 NOTE — DIETITIAN INITIAL EVALUATION ADULT. - ENERGY NEEDS
IBW used pt with recent weight loss of unknown amount.   Increased needs secondary to hypermetabolic state and planned OR Admission weight used as pt is within % ideal body weight.   Increased needs secondary to recent weight loss, hypermetabolic state and planned OR

## 2017-08-25 NOTE — BRIEF OPERATIVE NOTE - PROCEDURE
Direct laryngoscopy with biopsy  08/25/2017    Active  SE Direct laryngoscopy with biopsy  08/25/2017  Aidee Vaz

## 2017-08-25 NOTE — CONSULT NOTE ADULT - SUBJECTIVE AND OBJECTIVE BOX
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  Pt initially presented with throat pain x 1 month and subsequently noticed a left sided neck mass s/p incisional biopsy at OSH showing SCC. PET CT showed left piriform sinus mass w/ increased uptake as well as increased uptake in left nasopharynx, left palatine tonsil and left cervical LN. Pt reports dysphagia, decreased PO intake, hoarseness/voice loss, L-sided ear pain, but denies fever, chills, CP, SOB, abdominal pain, nausea, vomiting, melena, hematochezia, diarrhea, dysuria, urinary frequency/hesitancy, joint pain, or rash. Pt denies previous EGD or colonoscopy.    Allergies  No Known Allergies    HOME MEDICATIONS: NONE    MEDICATIONS:  MEDICATIONS  (STANDING):  lactated ringers. 1000 milliLiter(s) (75 mL/Hr) IV Continuous <Continuous>  nicotine - 21 mG/24Hr(s) Patch 1 patch Transdermal daily    MEDICATIONS  (PRN):  acetaminophen    Suspension. 650 milliGRAM(s) Oral every 6 hours PRN Mild Pain (1 - 3)  ondansetron Injectable 4 milliGRAM(s) IV Push every 6 hours PRN Nausea  LORazepam    IVPB 2 milliGRAM(s) IV Intermittent every 4 hours PRN Agitation/axiety, CIWA>8    PAST MEDICAL & SURGICAL HISTORY:  SCC   s/p R shoulder repair    FAMILY HISTORY:  Mother: Throat cancer    SOCIAL HISTORY:  [employer]: Daily, former 4 PPD, now 2 PPD  Alcohol: 1 pack of beer daily  Illicit Drugs: Denies    REVIEW OF SYSTEMS: per HPI    Vital Signs Last 24 Hrs  T(C): 36.8 (25 Aug 2017 06:55), Max: 37.1 (24 Aug 2017 17:07)  T(F): 98.2 (25 Aug 2017 06:55), Max: 98.7 (24 Aug 2017 17:07)  HR: 66 (25 Aug 2017 01:06) (66 - 85)  BP: 129/78 (25 Aug 2017 06:55) (113/77 - 142/77)  BP(mean): 97 (25 Aug 2017 01:06) (92 - 101)  RR: 16 (25 Aug 2017 06:55) (16 - 18)  SpO2: 99% (25 Aug 2017 06:55) (96% - 100%)    08-24 @ 07:01  -  08-25 @ 07:00  --------------------------------------------------------  IN: 450 mL / OUT: 200 mL / NET: 250 mL    PHYSICAL EXAM:    General: Well developed; well nourished; in no acute distress  HEENT: MMM, conjunctiva and sclera clear  Neck: Hard mobile palpable L sided mass with overlying dressing, CDI  Gastrointestinal: Soft, non-tender non-distended; Normal bowel sounds; No rebound or guarding  Extremities: Normal range of motion, No clubbing, cyanosis or edema  Neurological: Alert and oriented x3  Skin: Warm and dry. No obvious rash    LABS:                        15.4   9.3   )-----------( 220      ( 24 Aug 2017 18:20 )             45.5     08-24    141  |  102  |  17  ----------------------------<  123<H>  4.1   |  25  |  0.90    Ca    9.6      24 Aug 2017 18:19  Phos  3.6     08-24  Mg     2.1     08-24    TPro  8.0  /  Alb  4.4  /  TBili  0.4  /  DBili  x   /  AST  16  /  ALT  12  /  AlkPhos  128<H>  08-24    RADIOLOGY & ADDITIONAL STUDIES:   CXR: No focal infiltrates, awaiting read  CTN: Pending

## 2017-08-25 NOTE — CONSULT NOTE ADULT - ASSESSMENT
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

## 2017-08-25 NOTE — PROGRESS NOTE ADULT - PROBLEM SELECTOR PLAN 2
Of neck and hypopharyngeal mass. To undergo above procedures today. Further plan pending findings/results.

## 2017-08-25 NOTE — DIETITIAN INITIAL EVALUATION ADULT. - OTHER INFO
Pt with metastatic pharyngeal CA.  Pt is planned for direct laryngoscopy, bronchoscopy, esophagoscopy with biopsy, possible tracheostomy and PEG placement.  UBW ~160-180lbs reported from several sources.  Pt endorses decreased PO intake as of late secondary to pain with swallowing.  Pt is currently 150lbs indicative of recent weight loss of unquantifiable amount over the last 6 months.  Pt is NPO at present.  Pt denies GI distress; pain is being managed.  Skin: intact.

## 2017-08-25 NOTE — PROGRESS NOTE ADULT - PROBLEM SELECTOR PLAN 3
Daily drinker for many years, last drink 24 hours ago, no signs of withdrawal so far. Low CIWA score. C/w ativan prn. Will monitor closely and reassess need to start standing benzodiazepine (librium). Can start thiamine/MVI/folic acid after procedures today.

## 2017-08-26 LAB
ANION GAP SERPL CALC-SCNC: 13 MMOL/L — SIGNIFICANT CHANGE UP (ref 5–17)
BUN SERPL-MCNC: 12 MG/DL — SIGNIFICANT CHANGE UP (ref 7–23)
CALCIUM SERPL-MCNC: 9 MG/DL — SIGNIFICANT CHANGE UP (ref 8.4–10.5)
CHLORIDE SERPL-SCNC: 103 MMOL/L — SIGNIFICANT CHANGE UP (ref 96–108)
CO2 SERPL-SCNC: 25 MMOL/L — SIGNIFICANT CHANGE UP (ref 22–31)
CREAT SERPL-MCNC: 1 MG/DL — SIGNIFICANT CHANGE UP (ref 0.5–1.3)
GLUCOSE SERPL-MCNC: 153 MG/DL — HIGH (ref 70–99)
MAGNESIUM SERPL-MCNC: 2 MG/DL — SIGNIFICANT CHANGE UP (ref 1.6–2.6)
PHOSPHATE SERPL-MCNC: 4.2 MG/DL — SIGNIFICANT CHANGE UP (ref 2.5–4.5)
POTASSIUM SERPL-MCNC: 4.2 MMOL/L — SIGNIFICANT CHANGE UP (ref 3.5–5.3)
POTASSIUM SERPL-SCNC: 4.2 MMOL/L — SIGNIFICANT CHANGE UP (ref 3.5–5.3)
SODIUM SERPL-SCNC: 141 MMOL/L — SIGNIFICANT CHANGE UP (ref 135–145)

## 2017-08-26 PROCEDURE — 99232 SBSQ HOSP IP/OBS MODERATE 35: CPT | Mod: GC

## 2017-08-26 RX ORDER — NICOTINE POLACRILEX 2 MG
4 GUM BUCCAL
Qty: 0 | Refills: 0 | Status: DISCONTINUED | OUTPATIENT
Start: 2017-08-26 | End: 2017-08-28

## 2017-08-26 RX ADMIN — BENZOCAINE AND MENTHOL 1 LOZENGE: 5; 1 LIQUID ORAL at 07:27

## 2017-08-26 RX ADMIN — BENZOCAINE AND MENTHOL 1 LOZENGE: 5; 1 LIQUID ORAL at 03:00

## 2017-08-26 RX ADMIN — SENNA PLUS 10 MILLILITER(S): 8.6 TABLET ORAL at 05:47

## 2017-08-26 RX ADMIN — OXYCODONE HYDROCHLORIDE 5 MILLIGRAM(S): 5 TABLET ORAL at 09:32

## 2017-08-26 RX ADMIN — BENZOCAINE AND MENTHOL 1 LOZENGE: 5; 1 LIQUID ORAL at 10:56

## 2017-08-26 RX ADMIN — HEPARIN SODIUM 5000 UNIT(S): 5000 INJECTION INTRAVENOUS; SUBCUTANEOUS at 05:46

## 2017-08-26 RX ADMIN — Medication 100 MILLIGRAM(S): at 05:47

## 2017-08-26 RX ADMIN — Medication 1 PATCH: at 17:24

## 2017-08-26 RX ADMIN — OXYCODONE HYDROCHLORIDE 5 MILLIGRAM(S): 5 TABLET ORAL at 23:50

## 2017-08-26 RX ADMIN — OXYCODONE HYDROCHLORIDE 5 MILLIGRAM(S): 5 TABLET ORAL at 03:28

## 2017-08-26 RX ADMIN — HEPARIN SODIUM 5000 UNIT(S): 5000 INJECTION INTRAVENOUS; SUBCUTANEOUS at 22:05

## 2017-08-26 RX ADMIN — SENNA PLUS 10 MILLILITER(S): 8.6 TABLET ORAL at 17:23

## 2017-08-26 RX ADMIN — HEPARIN SODIUM 5000 UNIT(S): 5000 INJECTION INTRAVENOUS; SUBCUTANEOUS at 13:30

## 2017-08-26 RX ADMIN — SODIUM CHLORIDE 75 MILLILITER(S): 9 INJECTION, SOLUTION INTRAVENOUS at 03:27

## 2017-08-26 RX ADMIN — Medication 4 MILLIGRAM(S): at 22:05

## 2017-08-26 RX ADMIN — OXYCODONE HYDROCHLORIDE 5 MILLIGRAM(S): 5 TABLET ORAL at 23:17

## 2017-08-26 RX ADMIN — OXYCODONE HYDROCHLORIDE 5 MILLIGRAM(S): 5 TABLET ORAL at 04:00

## 2017-08-26 RX ADMIN — Medication 1 TABLET(S): at 09:32

## 2017-08-26 RX ADMIN — Medication 1 MILLIGRAM(S): at 05:47

## 2017-08-26 RX ADMIN — Medication 204 MILLIGRAM(S): at 13:37

## 2017-08-26 RX ADMIN — BENZOCAINE AND MENTHOL 1 LOZENGE: 5; 1 LIQUID ORAL at 23:05

## 2017-08-26 NOTE — PROVIDER CONTACT NOTE (CHANGE IN STATUS NOTIFICATION) - ASSESSMENT
Noted that pt stated his wife fell in room last nite, and offered security and ER to her, but stated she was fine and only hit her knees but felt fine, Saul, supervisor notified and aware , pts wife stated she felt fine and didn't need to go to ER.

## 2017-08-26 NOTE — PROGRESS NOTE ADULT - SUBJECTIVE AND OBJECTIVE BOX
ENT [facility] DAILY PROGRESS NOTE    HPI: 62M who initially presented with throat pain for about a month which he attributed to a viral pharyngitis at first.  His symptoms then progressed to persistent throat pain, dysphagia, decreased PO tolerance, dysphonia, and left sided otalgia.  He also noticed a left sided neck mass which prompted his to go to MD.  He underwent an incisional biopsy at outside hospital which was positive for SCC and subsequent PET CT which showed left piriform sinus mass w/ increased uptake as well as increased uptake in left nasopharynx, left palatine tonsil and left cervical LN.  At this time he was referred to Dr. Martinez who recommended inpatient admission, DL/biopsy, IV hydration and possibel PEG.  Since his biopsy he endorses worsening PO tolerance and now daily otalgia.  No dyspnea/SOB, cough, or hemoptysis.     8/25 direct laryngoscopy and biopsy, PEG  8/26 EMA overnight. tolerating PO diet. Nursing overnight concerned about differing opinions between patient and his family members and called ethics committee to help mediate. Will hold off on starting tube feeds for now. Pain controlled. No signs of w/d at this time    PE:  Gen: NAD,  Resp: non-labored breathing on RA, no stridor, tachypnea or retractions  OC/OP: no gross bleeding  abd PEG in place, abd soft, appropriately minimally ttp         Allergies    No Known Allergies    Intolerances        MEDICATIONS:  Antiinfectives:     IV fluids:  lactated ringers. 1000 milliLiter(s) IV Continuous <Continuous>  thiamine 100 milliGRAM(s) Enteral Tube daily  folic acid 1 milliGRAM(s) Enteral Tube daily  multivitamin  Chewable 1 Tablet(s) Chew daily    Hematologic/Anticoagulation:  heparin  Injectable 5000 Unit(s) SubCutaneous every 8 hours    Pain medications/Neuro:  ondansetron Injectable 4 milliGRAM(s) IV Push every 6 hours PRN  LORazepam    IVPB 2 milliGRAM(s) IV Intermittent every 4 hours PRN  acetaminophen    Suspension. 650 milliGRAM(s) Oral every 6 hours PRN  HYDROmorphone  Injectable 0.5 milliGRAM(s) IV Push every 4 hours PRN  oxyCODONE    Solution 5 milliGRAM(s) Oral every 4 hours PRN    Endocrine Medications:     All other standing medications:   nicotine - 21 mG/24Hr(s) Patch 1 patch Transdermal daily  senna Syrup 10 milliLiter(s) Oral two times a day    All other PRN medications:  benzocaine 15 mG/menthol 3.6 mG Lozenge 1 Lozenge Oral every 4 hours PRN      Vital Signs Last 24 Hrs  T(C): 36.5 (26 Aug 2017 05:33), Max: 37.1 (25 Aug 2017 16:30)  T(F): 97.7 (26 Aug 2017 05:33), Max: 98.7 (25 Aug 2017 16:30)  HR: 104 (26 Aug 2017 09:26) (53 - 104)  BP: 128/97 (26 Aug 2017 09:26) (101/60 - 166/103)  BP(mean): 84 (26 Aug 2017 05:27) (74 - 92)  RR: 20 (26 Aug 2017 09:26) (12 - 20)  SpO2: 97% (26 Aug 2017 09:26) (96% - 100%)      08-25 @ 07:01  -  08-26 @ 07:00  --------------------------------------------------------  IN:    lactated ringers.: 675 mL    Oral Fluid: 100 mL  Total IN: 775 mL    OUT:    Voided: 1300 mL  Total OUT: 1300 mL    Total NET: -525 mL              LABS:  CBC-                        15.4   9.3   )-----------( 220      ( 24 Aug 2017 18:20 )             45.5     BMP/CMP-  26 Aug 2017 05:52    141    |  103    |  12     ----------------------------<  153    4.2     |  25     |  1.00     Ca    9.0        26 Aug 2017 05:52  Phos  4.2       26 Aug 2017 05:52  Mg     2.0       26 Aug 2017 05:52    TPro  8.0    /  Alb  4.4    /  TBili  0.4    /  DBili  x      /  AST  16     /  ALT  12     /  AlkPhos  128    24 Aug 2017 18:19    Coagulation Studies-  PT/INR - ( 24 Aug 2017 18:19 )   PT: 14.0 sec;   INR: 1.26            Endocrine Panel-  Calcium, Total Serum: 9.0 mg/dL (08-26 @ 05:52)              RADIOLOGY & ADDITIONAL STUDIES:      Assessment/Plan:  62M s/p DL and biopsy by ENT and PEG placed by GI earlier this AM.  No issues post-operatively, pain controlled, good hemostasis no respiratory difficulties  -Ativan PRN for w/d symptoms as per medicine recs  -pain control, prn nausea  -may administer meds via PEG but no feeds until 24 hours post-procedure, will hold off on starting tube feeds given that patient is tolerating po presently  -continue Nicotine patch  -cepachol drops PRN  -SCDs, oob ad aparna  -SQH  -Call ENT with questions      - d/w attending MD whom agrees with the above plan

## 2017-08-26 NOTE — PROGRESS NOTE ADULT - SUBJECTIVE AND OBJECTIVE BOX
Pt seen and examined at bedside, no acute overnight events. Pt s/p EGD with peg placement.     REVIEW OF SYSTEMS:  Constitutional: No fever, weight loss or fatigue  Cardiovascular: No chest pain, palpitations, dizziness or leg swelling  Gastrointestinal: No abdominal or epigastric pain. No nausea, vomiting or hematemesis; No diarrhea or constipation. No melena or hematochezia.  Skin: No itching, burning, rashes or lesions       MEDICATIONS:  MEDICATIONS  (STANDING):  nicotine - 21 mG/24Hr(s) Patch 1 patch Transdermal daily  thiamine 100 milliGRAM(s) Enteral Tube daily  folic acid 1 milliGRAM(s) Enteral Tube daily  multivitamin  Chewable 1 Tablet(s) Chew daily  senna Syrup 10 milliLiter(s) Oral two times a day  heparin  Injectable 5000 Unit(s) SubCutaneous every 8 hours    MEDICATIONS  (PRN):  ondansetron Injectable 4 milliGRAM(s) IV Push every 6 hours PRN Nausea  LORazepam    IVPB 2 milliGRAM(s) IV Intermittent every 4 hours PRN Agitation/axiety, CIWA>8  acetaminophen    Suspension. 650 milliGRAM(s) Oral every 6 hours PRN Mild Pain (1 - 3)  HYDROmorphone  Injectable 0.5 milliGRAM(s) IV Push every 4 hours PRN Severe Pain (7 - 10)  oxyCODONE    Solution 5 milliGRAM(s) Oral every 4 hours PRN Moderate Pain (4 - 6)  benzocaine 15 mG/menthol 3.6 mG Lozenge 1 Lozenge Oral every 4 hours PRN Sore Throat      Allergies    No Known Allergies    Intolerances        Vital Signs Last 24 Hrs  T(C): 36.5 (26 Aug 2017 05:33), Max: 37.1 (25 Aug 2017 16:30)  T(F): 97.7 (26 Aug 2017 05:33), Max: 98.7 (25 Aug 2017 16:30)  HR: 104 (26 Aug 2017 09:26) (53 - 104)  BP: 128/97 (26 Aug 2017 09:26) (101/60 - 166/103)  BP(mean): 84 (26 Aug 2017 05:27) (74 - 92)  RR: 20 (26 Aug 2017 09:26) (12 - 20)  SpO2: 97% (26 Aug 2017 09:26) (96% - 100%)    08-25 @ 07:01 - 08-26 @ 07:00  --------------------------------------------------------  IN: 775 mL / OUT: 1300 mL / NET: -525 mL    08-26 @ 07:01 - 08-26 @ 09:51  --------------------------------------------------------  IN: 0 mL / OUT: 400 mL / NET: -400 mL        PHYSICAL EXAM:    General: Well developed; well nourished; in no acute distress  HEENT: MMM, conjunctiva and sclera clear  Gastrointestinal: Soft non-tender non-distended; bs+, peg site c/d/i, no evidence of bleeding     LABS:      CBC Full  -  ( 24 Aug 2017 18:20 )  WBC Count : 9.3 K/uL  Hemoglobin : 15.4 g/dL  Hematocrit : 45.5 %  Platelet Count - Automated : 220 K/uL  Mean Cell Volume : 89.7 fL  Mean Cell Hemoglobin : 30.4 pg  Mean Cell Hemoglobin Concentration : 33.8 g/dL  Auto Neutrophil # : x  Auto Lymphocyte # : x  Auto Monocyte # : x  Auto Eosinophil # : x  Auto Basophil # : x  Auto Neutrophil % : 68.1 %  Auto Lymphocyte % : 20.3 %  Auto Monocyte % : 6.2 %  Auto Eosinophil % : 5.0 %  Auto Basophil % : 0.4 %    08-26    141  |  103  |  12  ----------------------------<  153<H>  4.2   |  25  |  1.00    Ca    9.0      26 Aug 2017 05:52  Phos  4.2     08-26  Mg     2.0     08-26    TPro  8.0  /  Alb  4.4  /  TBili  0.4  /  DBili  x   /  AST  16  /  ALT  12  /  AlkPhos  128<H>  08-24    PT/INR - ( 24 Aug 2017 18:19 )   PT: 14.0 sec;   INR: 1.26                            RADIOLOGY & ADDITIONAL STUDIES (The following images were personally reviewed):

## 2017-08-26 NOTE — PROGRESS NOTE ADULT - SUBJECTIVE AND OBJECTIVE BOX
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  medicine consulted for preop and for EtOH w/d.     INTERVAL HPI/OVERNIGHT EVENTS: pt s/p PEG with GI and direct laryngoscopy with biopsies. EMA otherwise.     subjective: pt seen and examined at bedside.     REVIEW OF SYSTEMS:    CONSTITUTIONAL: No weakness, fevers or chills  EYES/ENT: No visual changes;  No vertigo or throat pain   NECK: No pain or stiffness  RESPIRATORY: No cough, wheezing, hemoptysis; No shortness of breath  CARDIOVASCULAR: No chest pain or palpitations  GASTROINTESTINAL: No abdominal or epigastric pain. No nausea, vomiting, or hematemesis; No diarrhea or constipation. No melena or hematochezia.  GENITOURINARY: No dysuria, frequency or hematuria  NEUROLOGICAL: No numbness or weakness  SKIN: No itching, burning, rashes, or lesions   All other review of systems is negative unless indicated above.:	    MEDICATIONS  (STANDING):  nicotine - 21 mG/24Hr(s) Patch 1 patch Transdermal daily  thiamine 100 milliGRAM(s) Enteral Tube daily  folic acid 1 milliGRAM(s) Enteral Tube daily  multivitamin  Chewable 1 Tablet(s) Chew daily  senna Syrup 10 milliLiter(s) Oral two times a day  heparin  Injectable 5000 Unit(s) SubCutaneous every 8 hours    MEDICATIONS  (PRN):  ondansetron Injectable 4 milliGRAM(s) IV Push every 6 hours PRN Nausea  LORazepam    IVPB 2 milliGRAM(s) IV Intermittent every 4 hours PRN Agitation/axiety, CIWA>8  acetaminophen    Suspension. 650 milliGRAM(s) Oral every 6 hours PRN Mild Pain (1 - 3)  HYDROmorphone  Injectable 0.5 milliGRAM(s) IV Push every 4 hours PRN Severe Pain (7 - 10)  oxyCODONE    Solution 5 milliGRAM(s) Oral every 4 hours PRN Moderate Pain (4 - 6)  benzocaine 15 mG/menthol 3.6 mG Lozenge 1 Lozenge Oral every 4 hours PRN Sore Throat    RADIOLOGY & ADDITIONAL TESTS:    Imaging Personally Reviewed:  [x ] YES  [ ] NO    Consultant(s) Notes Reviewed:  [x ] YES  [ ] NO    ICU Vital Signs Last 24 Hrs  T(C): 36.5 (26 Aug 2017 05:33), Max: 37.1 (25 Aug 2017 16:30)  T(F): 97.7 (26 Aug 2017 05:33), Max: 98.7 (25 Aug 2017 16:30)  HR: 104 (26 Aug 2017 09:26) (53 - 104)  BP: 128/97 (26 Aug 2017 09:26) (101/60 - 166/103)  BP(mean): 84 (26 Aug 2017 05:27) (74 - 92)  ABP: --  ABP(mean): --  RR: 20 (26 Aug 2017 09:26) (12 - 20)  SpO2: 97% (26 Aug 2017 09:26) (96% - 100%)    PHYSICAL EXAM:  GENERAL: NAD, well-groomed, well-developed  HEAD:  Atraumatic, Normocephalic  EYES: EOMI, PERRLA, conjunctiva and sclera clear  ENMT: No tonsillar erythema, exudates, or enlargement; Moist mucous membranes, Good dentition, No lesions  NECK: Supple, No JVD, Normal thyroid  NERVOUS SYSTEM:  Alert & Oriented X3, Good concentration; Motor Strength 5/5 B/L upper and lower extremities; DTRs 2+ intact and symmetric  CHEST/LUNG: Clear to percussion bilaterally; No rales, rhonchi, wheezing, or rubs  HEART: Regular rate and rhythm; No murmurs, rubs, or gallops  ABDOMEN: Soft, Nontender, Nondistended; Bowel sounds present  EXTREMITIES:  2+ Peripheral Pulses, No clubbing, cyanosis, or edema  LYMPH: No lymphadenopathy noted  SKIN: No rashes or lesions                          15.4   9.3   )-----------( 220      ( 24 Aug 2017 18:20 )             45.5     26 Aug 2017 05:52    141    |  103    |  12     ----------------------------<  153    4.2     |  25     |  1.00     Ca    9.0        26 Aug 2017 05:52  Phos  4.2       26 Aug 2017 05:52  Mg     2.0       26 Aug 2017 05:52    TPro  8.0    /  Alb  4.4    /  TBili  0.4    /  DBili  x      /  AST  16     /  ALT  12     /  AlkPhos  128    24 Aug 2017 18:19    LIVER FUNCTIONS - ( 24 Aug 2017 18:19 )  Alb: 4.4 g/dL / Pro: 8.0 g/dL / ALK PHOS: 128 U/L / ALT: 12 U/L / AST: 16 U/L / GGT: x           PT/INR - ( 24 Aug 2017 18:19 )   PT: 14.0 sec;   INR: 1.26          CT neck with IVC: 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   Involvement of the preepiglottic and left para glottic fat with extension   to the level of the left vocal cord. For further characterization of   thyroid cartilage and left vocal cord involvement, would recommend   comparison to the patient's prior MRI studies.    1 cm x 0.5 cm focus of air in the left juxta and infrahyoid  supraglottic   soft tissues abutting the medial posterior aspect of the left strap   muscle. This lesion contains a tiny air-fluid level and may represent the   patient's previous biopsy site versus necrosis versus infection.      a/p: 62M with SCC of SG s/p DL and biopsy by ENT and PEG placed by GI, medicine following for preop and now etoh w/d.     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 pt s/p DL with biopsy with ENT and PEG with GI. no issues post op.   - treatment as per ENT and GI    etoh w/d: currently CIWA is __. pt has not required any ativan    - c/w ativan prn  -will monitor closely and reassess need to start standing librium  - c/w MTVN thiamine and folate    smoker: c/w nicotine patch    d/w primary team  medicine will continue to follow    case d/w Dr. 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  medicine consulted for preop and for EtOH w/d.     INTERVAL HPI/OVERNIGHT EVENTS: pt s/p PEG with GI and direct laryngoscopy with biopsies. EMA otherwise.     subjective: pt seen and examined at bedside. complains of pain at peg site when moving around or when coughing. otherwise, no complaints.     REVIEW OF SYSTEMS:    CONSTITUTIONAL: No weakness, fevers or chills  EYES/ENT: No visual changes;  No vertigo or throat pain   NECK: No pain or stiffness  RESPIRATORY: No cough, wheezing, hemoptysis; No shortness of breath  CARDIOVASCULAR: No chest pain or palpitations  GASTROINTESTINAL: mild abdominal pain. no epigastric pain. No nausea, vomiting, or hematemesis; No diarrhea or constipation. No melena or hematochezia.  GENITOURINARY: No dysuria, frequency or hematuria  NEUROLOGICAL: No numbness or weakness  SKIN: No itching, burning, rashes, or lesions   All other review of systems is negative unless indicated above.:	    MEDICATIONS  (STANDING):  nicotine - 21 mG/24Hr(s) Patch 1 patch Transdermal daily  thiamine 100 milliGRAM(s) Enteral Tube daily  folic acid 1 milliGRAM(s) Enteral Tube daily  multivitamin  Chewable 1 Tablet(s) Chew daily  senna Syrup 10 milliLiter(s) Oral two times a day  heparin  Injectable 5000 Unit(s) SubCutaneous every 8 hours    MEDICATIONS  (PRN):  ondansetron Injectable 4 milliGRAM(s) IV Push every 6 hours PRN Nausea  LORazepam    IVPB 2 milliGRAM(s) IV Intermittent every 4 hours PRN Agitation/axiety, CIWA>8  acetaminophen    Suspension. 650 milliGRAM(s) Oral every 6 hours PRN Mild Pain (1 - 3)  HYDROmorphone  Injectable 0.5 milliGRAM(s) IV Push every 4 hours PRN Severe Pain (7 - 10)  oxyCODONE    Solution 5 milliGRAM(s) Oral every 4 hours PRN Moderate Pain (4 - 6)  benzocaine 15 mG/menthol 3.6 mG Lozenge 1 Lozenge Oral every 4 hours PRN Sore Throat    RADIOLOGY & ADDITIONAL TESTS:    Imaging Personally Reviewed:  [x ] YES  [ ] NO    Consultant(s) Notes Reviewed:  [x ] YES  [ ] NO    ICU Vital Signs Last 24 Hrs  T(C): 36.5 (26 Aug 2017 05:33), Max: 37.1 (25 Aug 2017 16:30)  T(F): 97.7 (26 Aug 2017 05:33), Max: 98.7 (25 Aug 2017 16:30)  HR: 104 (26 Aug 2017 09:26) (53 - 104)  BP: 128/97 (26 Aug 2017 09:26) (101/60 - 166/103)  BP(mean): 84 (26 Aug 2017 05:27) (74 - 92)  ABP: --  ABP(mean): --  RR: 20 (26 Aug 2017 09:26) (12 - 20)  SpO2: 97% (26 Aug 2017 09:26) (96% - 100%)    PHYSICAL EXAM:  GENERAL: NAD, well-groomed, well-developed  HEAD:  Atraumatic, Normocephalic  EYES: EOMI, PERRLA, conjunctiva and sclera clear  ENMT: No tonsillar erythema, exudates, or enlargement; Moist mucous membranes, Good dentition, No lesions  NECK: Supple, No JVD, Normal thyroid  NERVOUS SYSTEM:  Alert & Oriented X3, Good concentration; Motor Strength 5/5 B/L upper and lower extremities; DTRs 2+ intact and symmetric  CHEST/LUNG: Clear to percussion bilaterally; No rales, rhonchi, wheezing, or rubs  HEART: Regular rate and rhythm; No murmurs, rubs, or gallops  ABDOMEN: Soft, slightly tender near PEG site. Nondistended; Bowel sounds present  EXTREMITIES:  2+ Peripheral Pulses, No clubbing, cyanosis, or edema  LYMPH: No lymphadenopathy noted  SKIN: No rashes or lesions    CIWA: 0    Labs:                          15.4   9.3   )-----------( 220      ( 24 Aug 2017 18:20 )             45.5     26 Aug 2017 05:52    141    |  103    |  12     ----------------------------<  153    4.2     |  25     |  1.00     Ca    9.0        26 Aug 2017 05:52  Phos  4.2       26 Aug 2017 05:52  Mg     2.0       26 Aug 2017 05:52    TPro  8.0    /  Alb  4.4    /  TBili  0.4    /  DBili  x      /  AST  16     /  ALT  12     /  AlkPhos  128    24 Aug 2017 18:19    LIVER FUNCTIONS - ( 24 Aug 2017 18:19 )  Alb: 4.4 g/dL / Pro: 8.0 g/dL / ALK PHOS: 128 U/L / ALT: 12 U/L / AST: 16 U/L / GGT: x           PT/INR - ( 24 Aug 2017 18:19 )   PT: 14.0 sec;   INR: 1.26          CT neck with IVC: 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   Involvement of the preepiglottic and left para glottic fat with extension   to the level of the left vocal cord. For further characterization of   thyroid cartilage and left vocal cord involvement, would recommend   comparison to the patient's prior MRI studies.    1 cm x 0.5 cm focus of air in the left juxta and infrahyoid  supraglottic   soft tissues abutting the medial posterior aspect of the left strap   muscle. This lesion contains a tiny air-fluid level and may represent the   patient's previous biopsy site versus necrosis versus infection.      a/p: 62M with SCC of SG s/p DL and biopsy by ENT and PEG placed by GI, medicine following for preop and now etoh w/d.     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 pt s/p DL with biopsy with ENT and PEG with GI. no issues post op.   - treatment as per ENT and GI    etoh w/d: currently CIWA is 0. pt has not required any ativan  - c/w ativan prn  -will monitor closely and reassess need to start standing librium if requiring consistent doses of ativan   - c/w MTV thiamine and folate    smoker: c/w nicotine patch    d/w primary team  medicine will continue to follow    case d/w Dr. Allen

## 2017-08-26 NOTE — PROGRESS NOTE ADULT - ASSESSMENT
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 S/p EGD with PEG tube placement.     - Can begin feeds via PEG tube  - Meds via PEG okay  - Please flush PEG after feeds  - Daily dressing changes and wound care.   - Care as per primary team.

## 2017-08-27 LAB
ANION GAP SERPL CALC-SCNC: 12 MMOL/L — SIGNIFICANT CHANGE UP (ref 5–17)
BUN SERPL-MCNC: 14 MG/DL — SIGNIFICANT CHANGE UP (ref 7–23)
CALCIUM SERPL-MCNC: 8.7 MG/DL — SIGNIFICANT CHANGE UP (ref 8.4–10.5)
CHLORIDE SERPL-SCNC: 104 MMOL/L — SIGNIFICANT CHANGE UP (ref 96–108)
CO2 SERPL-SCNC: 25 MMOL/L — SIGNIFICANT CHANGE UP (ref 22–31)
CREAT SERPL-MCNC: 1 MG/DL — SIGNIFICANT CHANGE UP (ref 0.5–1.3)
GLUCOSE SERPL-MCNC: 99 MG/DL — SIGNIFICANT CHANGE UP (ref 70–99)
MAGNESIUM SERPL-MCNC: 1.8 MG/DL — SIGNIFICANT CHANGE UP (ref 1.6–2.6)
PHOSPHATE SERPL-MCNC: 3.4 MG/DL — SIGNIFICANT CHANGE UP (ref 2.5–4.5)
POTASSIUM SERPL-MCNC: 3.4 MMOL/L — LOW (ref 3.5–5.3)
POTASSIUM SERPL-SCNC: 3.4 MMOL/L — LOW (ref 3.5–5.3)
SODIUM SERPL-SCNC: 141 MMOL/L — SIGNIFICANT CHANGE UP (ref 135–145)

## 2017-08-27 PROCEDURE — 99232 SBSQ HOSP IP/OBS MODERATE 35: CPT | Mod: GC

## 2017-08-27 RX ORDER — POTASSIUM CHLORIDE 20 MEQ
40 PACKET (EA) ORAL ONCE
Qty: 0 | Refills: 0 | Status: COMPLETED | OUTPATIENT
Start: 2017-08-27 | End: 2017-08-27

## 2017-08-27 RX ADMIN — Medication 1 PATCH: at 17:00

## 2017-08-27 RX ADMIN — Medication 1 MILLIGRAM(S): at 06:31

## 2017-08-27 RX ADMIN — OXYCODONE HYDROCHLORIDE 5 MILLIGRAM(S): 5 TABLET ORAL at 23:21

## 2017-08-27 RX ADMIN — BENZOCAINE AND MENTHOL 1 LOZENGE: 5; 1 LIQUID ORAL at 09:11

## 2017-08-27 RX ADMIN — Medication 100 MILLIGRAM(S): at 09:10

## 2017-08-27 RX ADMIN — BENZOCAINE AND MENTHOL 1 LOZENGE: 5; 1 LIQUID ORAL at 23:27

## 2017-08-27 RX ADMIN — Medication 650 MILLIGRAM(S): at 09:26

## 2017-08-27 RX ADMIN — HEPARIN SODIUM 5000 UNIT(S): 5000 INJECTION INTRAVENOUS; SUBCUTANEOUS at 06:31

## 2017-08-27 RX ADMIN — Medication 1 PATCH: at 16:58

## 2017-08-27 RX ADMIN — HEPARIN SODIUM 5000 UNIT(S): 5000 INJECTION INTRAVENOUS; SUBCUTANEOUS at 13:18

## 2017-08-27 RX ADMIN — HEPARIN SODIUM 5000 UNIT(S): 5000 INJECTION INTRAVENOUS; SUBCUTANEOUS at 23:21

## 2017-08-27 RX ADMIN — Medication 4 MILLIGRAM(S): at 09:11

## 2017-08-27 RX ADMIN — Medication 650 MILLIGRAM(S): at 17:05

## 2017-08-27 RX ADMIN — BENZOCAINE AND MENTHOL 1 LOZENGE: 5; 1 LIQUID ORAL at 18:30

## 2017-08-27 RX ADMIN — OXYCODONE HYDROCHLORIDE 5 MILLIGRAM(S): 5 TABLET ORAL at 18:31

## 2017-08-27 RX ADMIN — SENNA PLUS 10 MILLILITER(S): 8.6 TABLET ORAL at 06:31

## 2017-08-27 RX ADMIN — Medication 1 TABLET(S): at 09:11

## 2017-08-27 RX ADMIN — Medication 650 MILLIGRAM(S): at 17:04

## 2017-08-27 RX ADMIN — Medication 40 MILLIEQUIVALENT(S): at 09:10

## 2017-08-27 NOTE — PROVIDER CONTACT NOTE (CHANGE IN STATUS NOTIFICATION) - ASSESSMENT
Pt ambulated, sat oob to chair, dresssing applied to peg and peg teaching started with wife , his wife able to demonstrate giving a flush to peg. pt tolerating oral intake well.

## 2017-08-27 NOTE — PROGRESS NOTE ADULT - ASSESSMENT
61 yo male with SCC s/p PEG --    1) hx of ETOH abuse -- CIWA score currently 0; no evidence of withdrawal; agree with titrating down dosing of ativan  required x 1 in last 24 hrs      2) Tobacco abuse -- cont Nicotine patch ; would continue upon discharge as well.     3) SCC s/p PEG-- management as per primary team  ethics consult pending re PEG feeds

## 2017-08-27 NOTE — PROGRESS NOTE ADULT - SUBJECTIVE AND OBJECTIVE BOX
ENT [facility] DAILY PROGRESS NOTE    HPI: 62M who initially presented with throat pain for about a month which he attributed to a viral pharyngitis at first.  His symptoms then progressed to persistent throat pain, dysphagia, decreased PO tolerance, dysphonia, and left sided otalgia.  He also noticed a left sided neck mass which prompted his to go to [facility].  He underwent an incisional biopsy at outside hospital which was positive for SCC and subsequent PET CT which showed left piriform sinus mass w/ increased uptake as well as increased uptake in left nasopharynx, left palatine tonsil and left cervical LN.  At this time he was referred to Dr. Martinez who recommended inpatient admission, DL/biopsy, IV hydration and possibel PEG.  Since his biopsy he endorses worsening PO tolerance and now daily otalgia.  No dyspnea/SOB, cough, or hemoptysis.     8/25 direct laryngoscopy and biopsy, PEG  8/26 EMA overnight. tolerating PO diet. Nursing overnight concerned about differing opinions between patient and his family members and called ethics committee to help mediate. Will hold off on starting tube feeds for now. Pain controlled. Required ativan x1   8/27 EMA overnight. tolerating po diet--approx 1/2 tray. ambulating           Allergies    No Known Allergies    Intolerances        MEDICATIONS:  Antiinfectives:     IV fluids:  thiamine 100 milliGRAM(s) Enteral Tube daily  folic acid 1 milliGRAM(s) Enteral Tube daily  multivitamin  Chewable 1 Tablet(s) Chew daily    Hematologic/Anticoagulation:  heparin  Injectable 5000 Unit(s) SubCutaneous every 8 hours    Pain medications/Neuro:  ondansetron Injectable 4 milliGRAM(s) IV Push every 6 hours PRN  acetaminophen    Suspension. 650 milliGRAM(s) Oral every 6 hours PRN  HYDROmorphone  Injectable 0.5 milliGRAM(s) IV Push every 4 hours PRN  oxyCODONE    Solution 5 milliGRAM(s) Oral every 4 hours PRN  LORazepam   Injectable 1.5 milliGRAM(s) IntraMuscular two times a day PRN    Endocrine Medications:     All other standing medications:   nicotine - 21 mG/24Hr(s) Patch 1 patch Transdermal daily  senna Syrup 10 milliLiter(s) Oral two times a day    All other PRN medications:  benzocaine 15 mG/menthol 3.6 mG Lozenge 1 Lozenge Oral every 4 hours PRN  nicotine  Polacrilex Gum 4 milliGRAM(s) Oral every 2 hours PRN      Vital Signs Last 24 Hrs  T(C): 36.8 (27 Aug 2017 05:08), Max: 37.8 (26 Aug 2017 13:54)  T(F): 98.3 (27 Aug 2017 05:08), Max: 100.1 (26 Aug 2017 13:54)  HR: 70 (27 Aug 2017 08:22) (70 - 90)  BP: 134/88 (27 Aug 2017 08:22) (106/57 - 135/98)  BP(mean): 94 (27 Aug 2017 05:00) (74 - 95)  RR: 16 (27 Aug 2017 08:22) (16 - 20)  SpO2: 96% (27 Aug 2017 08:22) (94% - 98%)      08-26 @ 07:01  -  08-27 @ 07:00  --------------------------------------------------------  IN:  Total IN: 0 mL    OUT:    Voided: 1575 mL  Total OUT: 1575 mL    Total NET: -1575 mL            PHYSICAL EXAM:  Gen: NAD,  Resp: non-labored breathing on RA, no stridor, tachypnea or retractions  OC/OP: no gross bleeding; tml, no fasciculations   abd PEG in place, abd soft, appropriately minimally ttp     LABS:  CBC-    BMP/CMP-  27 Aug 2017 07:01    141    |  104    |  14     ----------------------------<  99     3.4     |  25     |  1.00     Ca    8.7        27 Aug 2017 07:01  Phos  3.4       27 Aug 2017 07:01  Mg     1.8       27 Aug 2017 07:01      Coagulation Studies-    Endocrine Panel-  Calcium, Total Serum: 8.7 mg/dL (08-27 @ 07:01)              RADIOLOGY & ADDITIONAL STUDIES:    Assessment/Plan:  62M s/p DL and biopsy by ENT and PEG placed by GI earlier this AM.  No issues post-operatively, pain controlled, good hemostasis no respiratory difficulties  -Ativan PRN for w/d symptoms as per medicine recs decr dose 2/2 nursing concern that pt was too sleeping after 2mg IV   -pain control, prn nausea  -may administer meds via PEG, will hold off on starting tube feeds given that patient is tolerating po presently  -peg teaching for family and patient  -continue Nicotine patch, gum  -cepacol drops PRN  -SCDs, oob ad aparna, ambulate  -SQH  -Call ENT with questions      - d/w attending MD whom agrees with the above plan

## 2017-08-28 ENCOUNTER — TRANSCRIPTION ENCOUNTER (OUTPATIENT)
Age: 62
End: 2017-08-28

## 2017-08-28 VITALS
OXYGEN SATURATION: 94 % | SYSTOLIC BLOOD PRESSURE: 152 MMHG | RESPIRATION RATE: 21 BRPM | DIASTOLIC BLOOD PRESSURE: 88 MMHG | HEART RATE: 94 BPM

## 2017-08-28 LAB
ANION GAP SERPL CALC-SCNC: 11 MMOL/L — SIGNIFICANT CHANGE UP (ref 5–17)
BUN SERPL-MCNC: 13 MG/DL — SIGNIFICANT CHANGE UP (ref 7–23)
CALCIUM SERPL-MCNC: 9 MG/DL — SIGNIFICANT CHANGE UP (ref 8.4–10.5)
CHLORIDE SERPL-SCNC: 103 MMOL/L — SIGNIFICANT CHANGE UP (ref 96–108)
CO2 SERPL-SCNC: 28 MMOL/L — SIGNIFICANT CHANGE UP (ref 22–31)
CREAT SERPL-MCNC: 1.1 MG/DL — SIGNIFICANT CHANGE UP (ref 0.5–1.3)
GLUCOSE SERPL-MCNC: 99 MG/DL — SIGNIFICANT CHANGE UP (ref 70–99)
MAGNESIUM SERPL-MCNC: 1.9 MG/DL — SIGNIFICANT CHANGE UP (ref 1.6–2.6)
PHOSPHATE SERPL-MCNC: 3.8 MG/DL — SIGNIFICANT CHANGE UP (ref 2.5–4.5)
POTASSIUM SERPL-MCNC: 3.8 MMOL/L — SIGNIFICANT CHANGE UP (ref 3.5–5.3)
POTASSIUM SERPL-SCNC: 3.8 MMOL/L — SIGNIFICANT CHANGE UP (ref 3.5–5.3)
SODIUM SERPL-SCNC: 142 MMOL/L — SIGNIFICANT CHANGE UP (ref 135–145)
SURGICAL PATHOLOGY STUDY: SIGNIFICANT CHANGE UP

## 2017-08-28 PROCEDURE — 88305 TISSUE EXAM BY PATHOLOGIST: CPT

## 2017-08-28 PROCEDURE — 83735 ASSAY OF MAGNESIUM: CPT

## 2017-08-28 PROCEDURE — 99233 SBSQ HOSP IP/OBS HIGH 50: CPT | Mod: GC

## 2017-08-28 PROCEDURE — 84100 ASSAY OF PHOSPHORUS: CPT

## 2017-08-28 PROCEDURE — 85025 COMPLETE CBC W/AUTO DIFF WBC: CPT

## 2017-08-28 PROCEDURE — 80053 COMPREHEN METABOLIC PANEL: CPT

## 2017-08-28 PROCEDURE — 86900 BLOOD TYPING SEROLOGIC ABO: CPT

## 2017-08-28 PROCEDURE — 70491 CT SOFT TISSUE NECK W/DYE: CPT

## 2017-08-28 PROCEDURE — 71045 X-RAY EXAM CHEST 1 VIEW: CPT

## 2017-08-28 PROCEDURE — 80048 BASIC METABOLIC PNL TOTAL CA: CPT

## 2017-08-28 PROCEDURE — 86850 RBC ANTIBODY SCREEN: CPT

## 2017-08-28 PROCEDURE — 85610 PROTHROMBIN TIME: CPT

## 2017-08-28 PROCEDURE — 93005 ELECTROCARDIOGRAM TRACING: CPT

## 2017-08-28 PROCEDURE — 86901 BLOOD TYPING SEROLOGIC RH(D): CPT

## 2017-08-28 PROCEDURE — 36415 COLL VENOUS BLD VENIPUNCTURE: CPT

## 2017-08-28 RX ORDER — NICOTINE POLACRILEX 2 MG
1 GUM BUCCAL
Qty: 30 | Refills: 0 | OUTPATIENT
Start: 2017-08-28

## 2017-08-28 RX ORDER — THIAMINE MONONITRATE (VIT B1) 100 MG
1 TABLET ORAL
Qty: 30 | Refills: 0 | OUTPATIENT
Start: 2017-08-28 | End: 2017-09-27

## 2017-08-28 RX ORDER — FOLIC ACID 0.8 MG
1 TABLET ORAL
Qty: 30 | Refills: 0 | OUTPATIENT
Start: 2017-08-28 | End: 2017-09-27

## 2017-08-28 RX ORDER — BENZOCAINE AND MENTHOL 5; 1 G/100ML; G/100ML
1 LIQUID ORAL
Qty: 30 | Refills: 0 | OUTPATIENT
Start: 2017-08-28 | End: 2017-09-04

## 2017-08-28 RX ADMIN — OXYCODONE HYDROCHLORIDE 5 MILLIGRAM(S): 5 TABLET ORAL at 07:56

## 2017-08-28 RX ADMIN — SENNA PLUS 10 MILLILITER(S): 8.6 TABLET ORAL at 05:27

## 2017-08-28 RX ADMIN — BENZOCAINE AND MENTHOL 1 LOZENGE: 5; 1 LIQUID ORAL at 10:40

## 2017-08-28 RX ADMIN — Medication 100 MILLIGRAM(S): at 12:54

## 2017-08-28 RX ADMIN — HEPARIN SODIUM 5000 UNIT(S): 5000 INJECTION INTRAVENOUS; SUBCUTANEOUS at 06:29

## 2017-08-28 RX ADMIN — Medication 1 TABLET(S): at 15:45

## 2017-08-28 RX ADMIN — Medication 1.5 MILLIGRAM(S): at 11:15

## 2017-08-28 RX ADMIN — HYDROMORPHONE HYDROCHLORIDE 0.5 MILLIGRAM(S): 2 INJECTION INTRAMUSCULAR; INTRAVENOUS; SUBCUTANEOUS at 11:14

## 2017-08-28 RX ADMIN — OXYCODONE HYDROCHLORIDE 5 MILLIGRAM(S): 5 TABLET ORAL at 11:10

## 2017-08-28 RX ADMIN — HEPARIN SODIUM 5000 UNIT(S): 5000 INJECTION INTRAVENOUS; SUBCUTANEOUS at 15:45

## 2017-08-28 RX ADMIN — OXYCODONE HYDROCHLORIDE 5 MILLIGRAM(S): 5 TABLET ORAL at 10:36

## 2017-08-28 RX ADMIN — Medication 1 MILLIGRAM(S): at 05:27

## 2017-08-28 RX ADMIN — BENZOCAINE AND MENTHOL 1 LOZENGE: 5; 1 LIQUID ORAL at 06:29

## 2017-08-28 RX ADMIN — OXYCODONE HYDROCHLORIDE 5 MILLIGRAM(S): 5 TABLET ORAL at 06:29

## 2017-08-28 RX ADMIN — OXYCODONE HYDROCHLORIDE 5 MILLIGRAM(S): 5 TABLET ORAL at 00:20

## 2017-08-28 NOTE — DISCHARGE NOTE ADULT - PLAN OF CARE
biopsy, PEG placement - Report any fevers, chills, difficulty or noisy breathing, increased difficulty swallowing or eating.   - Diet: Continue your regular diet as tolerated. Report any increasing difficulty with swallowing or eating.   -Activity: Resume regular activities as tolerated.   - Shower/Bathing: You can shower normally   - Take all medications as prescribed.   - Continue thiamine, folate, and multivitamin supplements daily.   - You can take tylenol over the counter from your local pharmacy as needed for  pain. If this is not enough, you can take percocet as needed for pain. Do not take both together.   Your PEG was placed by Dr. Snyder you should follow up with her office if you have any problems with your PEG or your PEG site, or if you need the PEG removed.    PEG care: clean around peg site with saline and gauze, do not place anything between skin and peg, may cover the top of the peg with gauze to keep clothes clean and dry. Flush PEG before and after feeds and medication administration with at least 30 cc of water to keep from getting clogged.  You are not getting any feeds through the PEG tube yet. - Report any fevers, chills, difficulty or noisy breathing, increased difficulty swallowing or eating.   - Diet: Continue your regular diet as tolerated. Report any increasing difficulty with swallowing or eating.   -Activity: Resume regular activities as tolerated.   - Shower/Bathing: You can shower normally   - Take all medications as prescribed.   - Continue thiamine, folate, and multivitamin supplements daily.   - You can take tylenol over the counter from your local pharmacy as needed for  pain. If this is not enough, you can take percocet as needed for pain. Do not take both together.   Your PEG was placed by Dr. Snyder you should follow up with her office if you have any problems with your PEG or your PEG site, or if you need the PEG removed.    PEG care: clean around peg site with saline and gauze, do not place anything between skin and peg, may cover the top of the peg with gauze to keep clothes clean and dry. Flush PEG before and after feeds and medication administration with at least 30 cc of water to keep from getting clogged.  You are not getting any feeds through the PEG tube yet  -Call the office to schedule follow-up with Dr. Martinez for 1-2 weeks  -Call Dr. Snyder's office if you have any questions or issues related to the PEG  -You should follow-up with your primary medical doctor in 1-2 weeks. If you do not have one, call Dr. Umanzor's office for follow-up.

## 2017-08-28 NOTE — DISCHARGE NOTE ADULT - MEDICATION SUMMARY - MEDICATIONS TO TAKE
I will START or STAY ON the medications listed below when I get home from the hospital:    Habitrol 21 mg/24 hr transdermal film, extended release  -- 1 patch by transdermal patch once a day  -- Indication: For Smoking cessation    Multiple Vitamins oral tablet  -- 1 tab(s) by mouth once a day  -- Indication: For EtOH    folic acid 1 mg oral tablet  -- 1 tab(s) by mouth once a day  -- Indication: For EtOH    thiamine 100 mg oral tablet  -- 1 tab(s) by mouth once a day  -- Indication: For EtOH

## 2017-08-28 NOTE — DISCHARGE NOTE ADULT - CARE PROVIDER_API CALL
Sissy Martinez), Otolaryngology  [address]  [address] [place] [address]  Phone: [phone]  Fax: [phone]    Ralph Snyder), Medicine  [address]  [place] [address]  Phone: [phone]  Fax: [phone] Sissy Martinez), Otolaryngology  [address]  [address] [place] [address]  Phone: [phone]  Fax: [phone]    Ralph Snyder), Medicine  [address] [facility] [address]  [place] [address]  Phone: [phone]  Fax: [phone]    Masha Umanzor), Internal Medicine  [address] [address]  Phone: [phone]  Fax: [phone]

## 2017-08-28 NOTE — DISCHARGE NOTE ADULT - CARE PROVIDERS DIRECT ADDRESSES
,[email or web address]

## 2017-08-28 NOTE — DISCHARGE NOTE ADULT - CARE PLAN
Principal Discharge DX:	Metastatic squamous cell carcinoma  Goal:	biopsy, PEG placement  Instructions for follow-up, activity and diet:	- Report any fevers, chills, difficulty or noisy breathing, increased difficulty swallowing or eating.   - Diet: Continue your regular diet as tolerated. Report any increasing difficulty with swallowing or eating.   -Activity: Resume regular activities as tolerated.   - Shower/Bathing: You can shower normally   - Take all medications as prescribed.   - Continue thiamine, folate, and multivitamin supplements daily.   - You can take tylenol over the counter from your local pharmacy as needed for  pain. If this is not enough, you can take percocet as needed for pain. Do not take both together.   Your PEG was placed by Dr. Snyder you should follow up with her office if you have any problems with your PEG or your PEG site, or if you need the PEG removed.    PEG care: clean around peg site with saline and gauze, do not place anything between skin and peg, may cover the top of the peg with gauze to keep clothes clean and dry. Flush PEG before and after feeds and medication administration with at least 30 cc of water to keep from getting clogged.  You are not getting any feeds through the PEG tube yet. Principal Discharge DX:	Metastatic squamous cell carcinoma  Goal:	biopsy, PEG placement  Instructions for follow-up, activity and diet:	- Report any fevers, chills, difficulty or noisy breathing, increased difficulty swallowing or eating.   - Diet: Continue your regular diet as tolerated. Report any increasing difficulty with swallowing or eating.   -Activity: Resume regular activities as tolerated.   - Shower/Bathing: You can shower normally   - Take all medications as prescribed.   - Continue thiamine, folate, and multivitamin supplements daily.   - You can take tylenol over the counter from your local pharmacy as needed for  pain. If this is not enough, you can take percocet as needed for pain. Do not take both together.   Your PEG was placed by Dr. Snyder you should follow up with her office if you have any problems with your PEG or your PEG site, or if you need the PEG removed.    PEG care: clean around peg site with saline and gauze, do not place anything between skin and peg, may cover the top of the peg with gauze to keep clothes clean and dry. Flush PEG before and after feeds and medication administration with at least 30 cc of water to keep from getting clogged.  You are not getting any feeds through the PEG tube yet  -Call the office to schedule follow-up with Dr. Martinze for 1-2 weeks  -Call Dr. Snyder's office if you have any questions or issues related to the PEG  -You should follow-up with your primary medical doctor in 1-2 weeks. If you do not have one, call Dr. Umanzor's office for follow-up.

## 2017-08-28 NOTE — DISCHARGE NOTE ADULT - PATIENT PORTAL LINK FT
“You can access the [employer] Patient Portal, offered by [facility], by registering with the following website: [email or web address]”

## 2017-08-28 NOTE — DISCHARGE NOTE ADULT - PROVIDER TOKENS
TOKEN:'[id number]',TOKEN:'[id number]' TOKEN:'[id number]:'[id number]:'[id number]'

## 2017-08-28 NOTE — PROGRESS NOTE ADULT - ASSESSMENT
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 S/p EGD with PEG tube placement.     - External bumper loosened 1.5 cm with improvement in abdominal pain  - PEG feeds pending discussion with ethics, flush PEG after feeds  - C/w meds via PEG  - Daily dressing changes and wound care.   - Care as per primary team  - Pt instructed on signs and symptoms of PEG tube infection and PEG tube management; after discharge pt instructed to return to ER for evaluation if signs of PEG tube dysfunction or infection

## 2017-08-28 NOTE — PROGRESS NOTE ADULT - ATTENDING COMMENTS
Seen and examined by me this morning. Doing well, some abdominal pain around PEG site-GI evaluated pt and readjusted PEG . No signs or symptoms of withdrawal, required only one ativan over weekend. Etoh cessation advised. D/w patient and family at length. Rest as above.
1) hx of ETOH abuse -- CIWA score 0; no evidence of withdrawal; not requiring ativan    2) Tobacco abuse -- cont Nicotine patch ; would continue upon discharge as well
D/w ENT and with family at bedside at length. Will continue to follow up.

## 2017-08-28 NOTE — PROGRESS NOTE ADULT - SUBJECTIVE AND OBJECTIVE BOX
Pt seen and examined at bedside. Pt reports pain at the site of the peg. Denies fever, chills, nausea, vomiting, melena, or hematochezia.     REVIEW OF SYSTEMS: per HPI    Allergies  No Known Allergies    MEDICATIONS:  MEDICATIONS  (STANDING):  nicotine - 21 mG/24Hr(s) Patch 1 patch Transdermal daily  thiamine 100 milliGRAM(s) Enteral Tube daily  folic acid 1 milliGRAM(s) Enteral Tube daily  multivitamin  Chewable 1 Tablet(s) Chew daily  senna Syrup 10 milliLiter(s) Oral two times a day  heparin  Injectable 5000 Unit(s) SubCutaneous every 8 hours    MEDICATIONS  (PRN):  ondansetron Injectable 4 milliGRAM(s) IV Push every 6 hours PRN Nausea  acetaminophen    Suspension. 650 milliGRAM(s) Oral every 6 hours PRN Mild Pain (1 - 3)  HYDROmorphone  Injectable 0.5 milliGRAM(s) IV Push every 4 hours PRN Severe Pain (7 - 10)  oxyCODONE    Solution 5 milliGRAM(s) Oral every 4 hours PRN Moderate Pain (4 - 6)  benzocaine 15 mG/menthol 3.6 mG Lozenge 1 Lozenge Oral every 4 hours PRN Sore Throat  nicotine  Polacrilex Gum 4 milliGRAM(s) Oral every 2 hours PRN nicotine withdrawl  LORazepam   Injectable 1.5 milliGRAM(s) IntraMuscular two times a day PRN anxiety, agitation or CIWA >8      Vital Signs Last 24 Hrs  T(C): 36.6 (28 Aug 2017 10:26), Max: 37.4 (27 Aug 2017 18:16)  T(F): 97.9 (28 Aug 2017 10:26), Max: 99.4 (27 Aug 2017 18:16)  HR: 68 (28 Aug 2017 08:32) (68 - 88)  BP: 151/86 (28 Aug 2017 08:32) (126/67 - 151/86)  BP(mean): 108 (28 Aug 2017 08:32) (108 - 113)  RR: 20 (28 Aug 2017 08:32) (16 - 20)  SpO2: 100% (28 Aug 2017 08:32) (95% - 100%)    08-27 @ 07:01  -  08-28 @ 07:00  --------------------------------------------------------  IN: 120 mL / OUT: 2113 mL / NET: -1993 mL    PHYSICAL EXAM:    General: Well developed; well nourished; in no acute distress  HEENT: MMM, conjunctiva and sclera clear  Gastrointestinal: Soft, non-distended, TTP milagros PEG site; PEG insertion site CDI, no surrounding erythema or increased warmth. Normal bowel sounds; No hepatosplenomegaly. No rebound or guarding; PEG external bumper at 2.5 cm  Skin: Warm and dry. No obvious rash    LABS:    08-28    142  |  103  |  13  ----------------------------<  99  3.8   |  28  |  1.10    Ca    9.0      28 Aug 2017 05:53  Phos  3.8     08-28  Mg     1.9     08-28    RADIOLOGY & ADDITIONAL STUDIES: No new radiologic studies.

## 2017-08-28 NOTE — DISCHARGE NOTE ADULT - HOSPITAL COURSE
ENT DISCHARGE SUMMARY    Date of admission: 8/24/17  Date of discharge: 8/28/17  Procedures: 1. PEG placement 2. Direct laryngoscopy, biopsy   Consults: Internal Medicine, GI    HPI: 62M who initially presented with throat pain for about a month which he attributed to a viral pharyngitis at first.  His symptoms then progressed to persistent throat pain, dysphagia, decreased PO tolerance, dysphonia, and left sided otalgia.  He also noticed a left sided neck mass which prompted his to go to [facility].  He underwent an incisional biopsy at outside hospital which was positive for SCC and subsequent PET CT which showed left piriform sinus mass w/ increased uptake as well as increased uptake in left nasopharynx, left palatine tonsil and left cervical LN.  At this time he was referred to Dr. Martinez who recommended inpatient admission, DL/biopsy, IV hydration and possibel PEG.  Since his biopsy he endorses worsening PO tolerance and now daily otalgia.  No dyspnea/SOB, cough, or hemoptysis.     8/25: Underwent uncomplicated direct laryngoscopy and biopsy, PEG placement by GI (Dr. Snyder). Transferred back to SDU, no issues.   8/26 EMA overnight. tolerating PO diet. Nursing overnight concerned about differing opinions between patient and his family members and called ethics committee to help mediate. Will hold off on starting tube feeds for now. Pain controlled. Required ativan x1   8/27 EMA overnight. tolerating po diet--approx 1/2 tray. ambulating. No prns required.   8/28: EMA overnight. AFVSS. No e/o alcohol withdrawal. Tolerating full regular diet. C/o pain at PEG site, GI called and said not unexpected postop. Loosened/re-positioned bumper. Ambulating, voiding, stable for d/c home.     Disposition: Home  Condition: Stable    Discharge instructions:   - Report any fevers, chills, difficulty or noisy breathing, increased difficulty swallowing or eating.   - Diet: Continue your regular diet as tolerated. Report any increasing difficulty with swallowing or eating.   -Activity: Resume regular activities as tolerated.   - Shower/Bathing: You can shower normally   - Take all medications as prescribed.   - Continue thiamine, folate, and multivitamin supplements daily.   - You can take tylenol over the counter from your local pharmacy as needed for  pain. If this is not enough, you can take percocet as needed for pain. Do not take both together.   Your PEG was placed by Dr. Snyder you should follow up with her office if you have any problems with your PEG or your PEG site, or if you need the PEG removed.    PEG care: clean around peg site with saline and gauze, do not place anything between skin and peg, may cover the top of the peg with gauze to keep clothes clean and dry. Flush PEG before and after feeds and medication administration with at least 30 cc of water to keep from getting clogged.  You are not getting any feeds through the PEG tube yet.    Discharge Medications:  -Tylenol OTC prn pain  -Percocet q4h prn pain  -Thiamine daily  -MVI daily  -Folate 1mg daily     Follow-Up  -Call the office to schedule a follow-up with Dr. Martinez for 1-2 weeks  -Call the office to follow-up with Dr. Snyder if there are any issues or questions about your PEG ENT DISCHARGE SUMMARY    Date of admission: 8/24/17  Date of discharge: 8/28/17  Procedures: 1. PEG placement 2. Direct laryngoscopy, biopsy   Consults: Internal Medicine, GI    HPI: 62M who initially presented with throat pain for about a month which he attributed to a viral pharyngitis at first.  His symptoms then progressed to persistent throat pain, dysphagia, decreased PO tolerance, dysphonia, and left sided otalgia.  He also noticed a left sided neck mass which prompted his to go to [facility].  He underwent an incisional biopsy at outside hospital which was positive for SCC and subsequent PET CT which showed left piriform sinus mass w/ increased uptake as well as increased uptake in left nasopharynx, left palatine tonsil and left cervical LN.  At this time he was referred to Dr. Martinez who recommended inpatient admission, DL/biopsy, IV hydration and possibel PEG.  Since his biopsy he endorses worsening PO tolerance and now daily otalgia.  No dyspnea/SOB, cough, or hemoptysis.     8/24: Direct admit to SDU for airway monitoring. Medicine consulted for alcohol withdrawal management - rec ativan 2mg prn.   8/25: Underwent uncomplicated direct laryngoscopy and biopsy, PEG placement by GI (Dr. Snyder). Transferred back to SDU, no issues.   8/26 EMA overnight. tolerating PO diet. Nursing overnight concerned about differing opinions between patient and his family members and called ethics committee to help mediate. Will hold off on starting tube feeds for now. Pain controlled. Required ativan x1   8/27 EMA overnight. tolerating po diet--approx 1/2 tray. ambulating. No prns required.   8/28: EMA overnight. AFVSS. No e/o alcohol withdrawal. Tolerating full regular diet. C/o pain at PEG site, GI called and said not unexpected postop. Loosened/re-positioned bumper. Ambulating, voiding, stable for d/c home.     Disposition: Home  Condition: Stable    Discharge instructions:   - Report any fevers, chills, difficulty or noisy breathing, increased difficulty swallowing or eating.   - Diet: Continue your regular diet as tolerated. Report any increasing difficulty with swallowing or eating.   -Activity: Resume regular activities as tolerated.   - Shower/Bathing: You can shower normally   - Take all medications as prescribed.   - Continue thiamine, folate, and multivitamin supplements daily.   - You can take tylenol over the counter from your local pharmacy as needed for  pain. If this is not enough, you can take percocet as needed for pain. Do not take both together.   Your PEG was placed by Dr. Snyder you should follow up with her office if you have any problems with your PEG or your PEG site, or if you need the PEG removed.    PEG care: clean around peg site with saline and gauze, do not place anything between skin and peg, may cover the top of the peg with gauze to keep clothes clean and dry. Flush PEG before and after feeds and medication administration with at least 30 cc of water to keep from getting clogged.  You are not getting any feeds through the PEG tube yet.    Discharge Medications:  -Tylenol OTC prn pain  -Percocet q4h prn pain  -Thiamine daily  -MVI daily  -Folate 1mg daily     Follow-Up  -Call the office to schedule a follow-up with Dr. Martinez for 1-2 weeks  -Call the office to follow-up with Dr. Snyder if there are any issues or questions about your PEG ENT DISCHARGE SUMMARY    Date of admission: 8/24/17  Date of discharge: 8/28/17  Procedures: 1. PEG placement 2. Direct laryngoscopy, biopsy   Consults: Internal Medicine, GI    HPI: 62M who initially presented with throat pain for about a month which he attributed to a viral pharyngitis at first.  His symptoms then progressed to persistent throat pain, dysphagia, decreased PO tolerance, dysphonia, and left sided otalgia.  He also noticed a left sided neck mass which prompted his to go to [facility].  He underwent an incisional biopsy at outside hospital which was positive for SCC and subsequent PET CT which showed left piriform sinus mass w/ increased uptake as well as increased uptake in left nasopharynx, left palatine tonsil and left cervical LN.  At this time he was referred to Dr. Martinez who recommended inpatient admission, DL/biopsy, IV hydration and possibel PEG.  Since his biopsy he endorses worsening PO tolerance and now daily otalgia.  No dyspnea/SOB, cough, or hemoptysis.     8/24: Direct admit to SDU for airway monitoring. Medicine consulted for alcohol withdrawal management - rec ativan 2mg prn.   8/25: Underwent uncomplicated direct laryngoscopy and biopsy, PEG placement by GI (Dr. Snyder). Transferred back to SDU, no issues.   8/26 EMA overnight. tolerating PO diet. Nursing overnight concerned about differing opinions between patient and his family members and called ethics committee to help mediate. Will hold off on starting tube feeds for now. Pain controlled. Required ativan x1   8/27 EMA overnight. tolerating po diet--approx 1/2 tray. ambulating. No prns required.   8/28: EMA overnight. AFVSS. No e/o alcohol withdrawal. Tolerating full regular diet. C/o pain at PEG site, GI called and said not unexpected postop. Loosened/re-positioned bumper 1.5cm with improvement in pain. Ambulating, voiding, stable for d/c home.     Disposition: Home  Condition: Stable    Discharge instructions:   - Report any fevers, chills, difficulty or noisy breathing, increased difficulty swallowing or eating.   - Diet: Continue your regular diet as tolerated. Report any increasing difficulty with swallowing or eating.   -Activity: Resume regular activities as tolerated.   - Shower/Bathing: You can shower normally   - Take all medications as prescribed.   - Continue thiamine, folate, and multivitamin supplements daily.   - You can take tylenol over the counter from your local pharmacy as needed for  pain. If this is not enough, you can take percocet as needed for pain. Do not take both together.   Your PEG was placed by Dr. Snyder you should follow up with her office if you have any problems with your PEG or your PEG site, or if you need the PEG removed.    PEG care: clean around peg site with saline and gauze, do not place anything between skin and peg, may cover the top of the peg with gauze to keep clothes clean and dry. Flush PEG before and after feeds and medication administration with at least 30 cc of water to keep from getting clogged.  You are not getting any feeds through the PEG tube yet.    Discharge Medications:  -Tylenol OTC prn pain  -Thiamine daily  -MVI daily  -Folate 1mg daily   -Nicotine patch    Follow-Up  -Call the office to schedule follow-up with Dr. Martinez for 1-2 weeks  -Call Dr. Snyder's office if you have any questions or issues related to the PEG  -You should follow-up with your primary medical doctor in 1-2 weeks. If you do not have one, call Dr. Umanzor's office for follow-up.

## 2017-08-28 NOTE — PROGRESS NOTE ADULT - SUBJECTIVE AND OBJECTIVE BOX
Medicine c/s progress note:     Patient is a 62y old  Male who presents with a chief complaint of recent diagnosis of SCC-- requiring inpatient admission for diagnostic biopsy/PEG placement. Medicine consulted for initial pre-op clearance and for continued follow up regarding alcohol withdrawal management     INTERVAL HPI/OVERNIGHT EVENTS:    pt s/p PEG and laryngoscopy with biopsies.     S: pt seen and examined at bedside. complains of pressure like pain just above the area of his PEG site. discomfort is 5/10 and no radiation. feels worse when coughing, but sometimes just comes on suddenly and resolves. otherwise, no complaints. no fever chills sob cp n/v/d dysuria.       MEDICATIONS  (STANDING):  nicotine - 21 mG/24Hr(s) Patch 1 patch Transdermal daily  thiamine 100 milliGRAM(s) Enteral Tube daily  folic acid 1 milliGRAM(s) Enteral Tube daily  multivitamin  Chewable 1 Tablet(s) Chew daily  senna Syrup 10 milliLiter(s) Oral two times a day  heparin  Injectable 5000 Unit(s) SubCutaneous every 8 hours    MEDICATIONS  (PRN):  ondansetron Injectable 4 milliGRAM(s) IV Push every 6 hours PRN Nausea  acetaminophen    Suspension. 650 milliGRAM(s) Oral every 6 hours PRN Mild Pain (1 - 3)  HYDROmorphone  Injectable 0.5 milliGRAM(s) IV Push every 4 hours PRN Severe Pain (7 - 10)  oxyCODONE    Solution 5 milliGRAM(s) Oral every 4 hours PRN Moderate Pain (4 - 6)  benzocaine 15 mG/menthol 3.6 mG Lozenge 1 Lozenge Oral every 4 hours PRN Sore Throat  nicotine  Polacrilex Gum 4 milliGRAM(s) Oral every 2 hours PRN nicotine withdrawl  LORazepam   Injectable 1.5 milliGRAM(s) IntraMuscular two times a day PRN anxiety, agitation or CIWA >8    Allergies: No Known Allergies    ICU Vital Signs Last 24 Hrs  T(C): 36.6 (28 Aug 2017 10:26), Max: 37.4 (27 Aug 2017 18:16)  T(F): 97.9 (28 Aug 2017 10:26), Max: 99.4 (27 Aug 2017 18:16)  HR: 68 (28 Aug 2017 08:32) (68 - 88)  BP: 151/86 (28 Aug 2017 08:32) (126/67 - 151/86)  BP(mean): 108 (28 Aug 2017 08:32) (108 - 113)  ABP: --  ABP(mean): --  RR: 20 (28 Aug 2017 08:32) (16 - 20)  SpO2: 100% (28 Aug 2017 08:32) (95% - 100%)      Physical Exam:    General:  resting comfortable, NAD  HEENT:  Nonicteric, PERRLA  CV:  RRR, no murmur, no JVD  Lungs:  CTA B/L,  Abdomen:  Soft, slightly tender above peg site. no erythema induration or purulence.   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 medicine called for pre op and management of EtOH w/d.     1) hx of ETOH abuse -- CIWA is now 0. pt required 1 dose of ativan over the weekend (just for rest, not for anxiety). no evidence of withdrawal.   > 4 days since last drink.   - can c/w tapering down BZD  - will continue to monitor    2) Tobacco abuse -- cont Nicotine patch   - pt agreeable to quit smoking upon d/c  - would f/u with his PMD re smoking cessation    3) SCC s/p biopsies and PEG-- management as per primary team  - pain at or near peg site. site looks benign, possibly from soreness from peg vs peg hub being too tight.   GI team made aware.     case d/w Dr Umanzor Medicine c/s progress note:     Patient is a 62y old  Male who presents with a chief complaint of recent diagnosis of SCC-- requiring inpatient admission for diagnostic biopsy/PEG placement. Medicine consulted for initial pre-op clearance and for continued follow up regarding alcohol withdrawal management     INTERVAL HPI/OVERNIGHT EVENTS:    pt s/p PEG and laryngoscopy with biopsies.     S: pt seen and examined at bedside. complains of pressure like pain just above the area of his PEG site. discomfort is 5/10 and no radiation. feels worse when coughing, but sometimes just comes on suddenly and resolves. otherwise, no complaints. no fever chills sob cp n/v/d dysuria.       MEDICATIONS  (STANDING):  nicotine - 21 mG/24Hr(s) Patch 1 patch Transdermal daily  thiamine 100 milliGRAM(s) Enteral Tube daily  folic acid 1 milliGRAM(s) Enteral Tube daily  multivitamin  Chewable 1 Tablet(s) Chew daily  senna Syrup 10 milliLiter(s) Oral two times a day  heparin  Injectable 5000 Unit(s) SubCutaneous every 8 hours    MEDICATIONS  (PRN):  ondansetron Injectable 4 milliGRAM(s) IV Push every 6 hours PRN Nausea  acetaminophen    Suspension. 650 milliGRAM(s) Oral every 6 hours PRN Mild Pain (1 - 3)  HYDROmorphone  Injectable 0.5 milliGRAM(s) IV Push every 4 hours PRN Severe Pain (7 - 10)  oxyCODONE    Solution 5 milliGRAM(s) Oral every 4 hours PRN Moderate Pain (4 - 6)  benzocaine 15 mG/menthol 3.6 mG Lozenge 1 Lozenge Oral every 4 hours PRN Sore Throat  nicotine  Polacrilex Gum 4 milliGRAM(s) Oral every 2 hours PRN nicotine withdrawl  LORazepam   Injectable 1.5 milliGRAM(s) IntraMuscular two times a day PRN anxiety, agitation or CIWA >8    Allergies: No Known Allergies    ICU Vital Signs Last 24 Hrs  T(C): 36.6 (28 Aug 2017 10:26), Max: 37.4 (27 Aug 2017 18:16)  T(F): 97.9 (28 Aug 2017 10:26), Max: 99.4 (27 Aug 2017 18:16)  HR: 68 (28 Aug 2017 08:32) (68 - 88)  BP: 151/86 (28 Aug 2017 08:32) (126/67 - 151/86)  BP(mean): 108 (28 Aug 2017 08:32) (108 - 113)  ABP: --  ABP(mean): --  RR: 20 (28 Aug 2017 08:32) (16 - 20)  SpO2: 100% (28 Aug 2017 08:32) (95% - 100%)      Physical Exam:    General:  resting comfortable, NAD  HEENT:  Nonicteric, PERRLA  CV:  RRR, no murmur, no JVD  Lungs:  CTA B/L,  Abdomen:  Soft, slightly tender above peg site. no erythema induration or purulence.   Neuro : AAOx3  CIWA 0      LABS:    08-28    142  |  103  |  13  ----------------------------<  99  3.8   |  28  |  1.10    Ca    9.0      28 Aug 2017 05:53  Phos  3.8     08-28  Mg     1.9     08-28    63 yo male with SCC of glottis s/p laryngoscopy with biopsies and s/p PEG. medicine called for pre op and management of EtOH w/d.     1) hx of ETOH abuse -- CIWA is now 0. pt required 1 dose of ativan over the weekend (just for rest, not for anxiety). no evidence of withdrawal.   > 4 days since last drink.   - can c/w tapering down BZD  - will continue to monitor    2) Tobacco abuse -- cont Nicotine patch   - pt agreeable to quit smoking upon d/c  - would f/u with his PMD re smoking cessation    3) SCC s/p biopsies and PEG-- management as per primary team  - pain at or near peg site. site looks benign, possibly from soreness from peg vs peg hub being too tight.     *GI team made aware. have loosened peg 1.5 cm. pt feels better now    pt to be d/c today with appropriate f/u as o/p    case d/w Dr Umanzor

## 2017-08-30 DIAGNOSIS — C76.0 MALIGNANT NEOPLASM OF HEAD, FACE AND NECK: ICD-10-CM

## 2017-08-30 DIAGNOSIS — R63.3 FEEDING DIFFICULTIES: ICD-10-CM

## 2017-08-30 DIAGNOSIS — F17.210 NICOTINE DEPENDENCE, CIGARETTES, UNCOMPLICATED: ICD-10-CM

## 2017-08-30 DIAGNOSIS — K29.80 DUODENITIS WITHOUT BLEEDING: ICD-10-CM

## 2017-08-30 DIAGNOSIS — R62.7 ADULT FAILURE TO THRIVE: ICD-10-CM

## 2017-08-30 DIAGNOSIS — R13.10 DYSPHAGIA, UNSPECIFIED: ICD-10-CM

## 2017-08-30 DIAGNOSIS — Z80.2 FAMILY HISTORY OF MALIGNANT NEOPLASM OF OTHER RESPIRATORY AND INTRATHORACIC ORGANS: ICD-10-CM

## 2017-08-30 DIAGNOSIS — C77.0 SECONDARY AND UNSPECIFIED MALIGNANT NEOPLASM OF LYMPH NODES OF HEAD, FACE AND NECK: ICD-10-CM

## 2017-08-30 DIAGNOSIS — F10.10 ALCOHOL ABUSE, UNCOMPLICATED: ICD-10-CM

## 2017-08-30 DIAGNOSIS — K29.70 GASTRITIS, UNSPECIFIED, WITHOUT BLEEDING: ICD-10-CM

## 2017-09-06 ENCOUNTER — APPOINTMENT (OUTPATIENT)
Dept: OTOLARYNGOLOGY | Facility: CLINIC | Age: 62
End: 2017-09-06
Payer: COMMERCIAL

## 2017-09-06 VITALS
SYSTOLIC BLOOD PRESSURE: 126 MMHG | HEART RATE: 63 BPM | WEIGHT: 146 LBS | HEIGHT: 69 IN | BODY MASS INDEX: 21.62 KG/M2 | DIASTOLIC BLOOD PRESSURE: 66 MMHG

## 2017-09-06 DIAGNOSIS — F17.200 NICOTINE DEPENDENCE, UNSPECIFIED, UNCOMPLICATED: ICD-10-CM

## 2017-09-06 PROCEDURE — 99215 OFFICE O/P EST HI 40 MIN: CPT | Mod: 24

## 2017-09-13 ENCOUNTER — APPOINTMENT (OUTPATIENT)
Dept: OTOLARYNGOLOGY | Facility: CLINIC | Age: 62
End: 2017-09-13

## 2017-09-13 PROBLEM — F17.200 CURRENT EVERY DAY SMOKER: Status: RESOLVED | Noted: 2017-09-06 | Resolved: 2017-09-13

## 2017-11-27 ENCOUNTER — APPOINTMENT (OUTPATIENT)
Dept: SURGERY | Facility: CLINIC | Age: 62
End: 2017-11-27
Payer: COMMERCIAL

## 2017-11-27 VITALS
OXYGEN SATURATION: 100 % | SYSTOLIC BLOOD PRESSURE: 114 MMHG | WEIGHT: 135 LBS | TEMPERATURE: 98.4 F | BODY MASS INDEX: 19.99 KG/M2 | DIASTOLIC BLOOD PRESSURE: 75 MMHG | HEART RATE: 90 BPM | HEIGHT: 69 IN

## 2017-11-27 DIAGNOSIS — Z80.0 FAMILY HISTORY OF MALIGNANT NEOPLASM OF DIGESTIVE ORGANS: ICD-10-CM

## 2017-11-27 DIAGNOSIS — Z87.891 PERSONAL HISTORY OF NICOTINE DEPENDENCE: ICD-10-CM

## 2017-11-27 DIAGNOSIS — Z78.9 OTHER SPECIFIED HEALTH STATUS: ICD-10-CM

## 2017-11-27 PROCEDURE — 99244 OFF/OP CNSLTJ NEW/EST MOD 40: CPT

## 2017-11-29 ENCOUNTER — EMERGENCY (EMERGENCY)
Facility: HOSPITAL | Age: 62
LOS: 1 days | Discharge: HOME | End: 2017-11-29
Attending: EMERGENCY MEDICINE | Admitting: EMERGENCY MEDICINE
Payer: COMMERCIAL

## 2017-11-29 ENCOUNTER — APPOINTMENT (OUTPATIENT)
Dept: OTOLARYNGOLOGY | Facility: CLINIC | Age: 62
End: 2017-11-29
Payer: COMMERCIAL

## 2017-11-29 VITALS
HEART RATE: 60 BPM | OXYGEN SATURATION: 100 % | SYSTOLIC BLOOD PRESSURE: 112 MMHG | RESPIRATION RATE: 16 BRPM | DIASTOLIC BLOOD PRESSURE: 76 MMHG | TEMPERATURE: 98 F

## 2017-11-29 VITALS
HEART RATE: 79 BPM | DIASTOLIC BLOOD PRESSURE: 90 MMHG | HEIGHT: 69 IN | WEIGHT: 135 LBS | SYSTOLIC BLOOD PRESSURE: 132 MMHG | BODY MASS INDEX: 19.99 KG/M2

## 2017-11-29 VITALS
DIASTOLIC BLOOD PRESSURE: 70 MMHG | RESPIRATION RATE: 19 BRPM | OXYGEN SATURATION: 98 % | HEART RATE: 74 BPM | SYSTOLIC BLOOD PRESSURE: 144 MMHG | TEMPERATURE: 98 F

## 2017-11-29 DIAGNOSIS — Z87.891 PERSONAL HISTORY OF NICOTINE DEPENDENCE: ICD-10-CM

## 2017-11-29 DIAGNOSIS — R55 SYNCOPE AND COLLAPSE: ICD-10-CM

## 2017-11-29 DIAGNOSIS — Z79.899 OTHER LONG TERM (CURRENT) DRUG THERAPY: ICD-10-CM

## 2017-11-29 LAB
ALBUMIN SERPL ELPH-MCNC: 4.2 G/DL — SIGNIFICANT CHANGE UP (ref 3.3–5)
ALP SERPL-CCNC: 103 U/L — SIGNIFICANT CHANGE UP (ref 40–120)
ALT FLD-CCNC: 9 U/L — LOW (ref 10–45)
ANION GAP SERPL CALC-SCNC: 14 MMOL/L — SIGNIFICANT CHANGE UP (ref 5–17)
APTT BLD: 33.5 SEC — SIGNIFICANT CHANGE UP (ref 27.5–37.4)
AST SERPL-CCNC: 14 U/L — SIGNIFICANT CHANGE UP (ref 10–40)
BASOPHILS NFR BLD AUTO: 0.2 % — SIGNIFICANT CHANGE UP (ref 0–2)
BILIRUB SERPL-MCNC: 0.3 MG/DL — SIGNIFICANT CHANGE UP (ref 0.2–1.2)
BUN SERPL-MCNC: 15 MG/DL — SIGNIFICANT CHANGE UP (ref 7–23)
CALCIUM SERPL-MCNC: 9.8 MG/DL — SIGNIFICANT CHANGE UP (ref 8.4–10.5)
CHLORIDE SERPL-SCNC: 102 MMOL/L — SIGNIFICANT CHANGE UP (ref 96–108)
CK MB CFR SERPL CALC: <1 NG/ML — SIGNIFICANT CHANGE UP (ref 0–6.7)
CK SERPL-CCNC: 48 U/L — SIGNIFICANT CHANGE UP (ref 30–200)
CO2 SERPL-SCNC: 26 MMOL/L — SIGNIFICANT CHANGE UP (ref 22–31)
CREAT SERPL-MCNC: 0.94 MG/DL — SIGNIFICANT CHANGE UP (ref 0.5–1.3)
EOSINOPHIL NFR BLD AUTO: 1.1 % — SIGNIFICANT CHANGE UP (ref 0–6)
GLUCOSE SERPL-MCNC: 100 MG/DL — HIGH (ref 70–99)
HCT VFR BLD CALC: 35.1 % — LOW (ref 39–50)
HGB BLD-MCNC: 11.9 G/DL — LOW (ref 13–17)
INR BLD: 1.34 — HIGH (ref 0.88–1.16)
LYMPHOCYTES # BLD AUTO: 3.4 % — LOW (ref 13–44)
MCHC RBC-ENTMCNC: 31 PG — SIGNIFICANT CHANGE UP (ref 27–34)
MCHC RBC-ENTMCNC: 33.9 G/DL — SIGNIFICANT CHANGE UP (ref 32–36)
MCV RBC AUTO: 91.4 FL — SIGNIFICANT CHANGE UP (ref 80–100)
MONOCYTES NFR BLD AUTO: 6.6 % — SIGNIFICANT CHANGE UP (ref 2–14)
NEUTROPHILS NFR BLD AUTO: 88.7 % — HIGH (ref 43–77)
PLATELET # BLD AUTO: 247 K/UL — SIGNIFICANT CHANGE UP (ref 150–400)
POTASSIUM SERPL-MCNC: 4.9 MMOL/L — SIGNIFICANT CHANGE UP (ref 3.5–5.3)
POTASSIUM SERPL-SCNC: 4.9 MMOL/L — SIGNIFICANT CHANGE UP (ref 3.5–5.3)
PROT SERPL-MCNC: 7.8 G/DL — SIGNIFICANT CHANGE UP (ref 6–8.3)
PROTHROM AB SERPL-ACNC: 15 SEC — HIGH (ref 9.8–12.7)
RBC # BLD: 3.84 M/UL — LOW (ref 4.2–5.8)
RBC # FLD: 15.7 % — SIGNIFICANT CHANGE UP (ref 10.3–16.9)
SODIUM SERPL-SCNC: 142 MMOL/L — SIGNIFICANT CHANGE UP (ref 135–145)
TROPONIN T SERPL-MCNC: <0.01 NG/ML — SIGNIFICANT CHANGE UP (ref 0–0.01)
WBC # BLD: 8 K/UL — SIGNIFICANT CHANGE UP (ref 3.8–10.5)
WBC # FLD AUTO: 8 K/UL — SIGNIFICANT CHANGE UP (ref 3.8–10.5)

## 2017-11-29 PROCEDURE — 99214 OFFICE O/P EST MOD 30 MIN: CPT

## 2017-11-29 PROCEDURE — 99283 EMERGENCY DEPT VISIT LOW MDM: CPT | Mod: 25

## 2017-11-29 PROCEDURE — 83735 ASSAY OF MAGNESIUM: CPT

## 2017-11-29 PROCEDURE — 82553 CREATINE MB FRACTION: CPT

## 2017-11-29 PROCEDURE — 82550 ASSAY OF CK (CPK): CPT

## 2017-11-29 PROCEDURE — 80053 COMPREHEN METABOLIC PANEL: CPT

## 2017-11-29 PROCEDURE — 84443 ASSAY THYROID STIM HORMONE: CPT

## 2017-11-29 PROCEDURE — 85610 PROTHROMBIN TIME: CPT

## 2017-11-29 PROCEDURE — 93005 ELECTROCARDIOGRAM TRACING: CPT

## 2017-11-29 PROCEDURE — 36415 COLL VENOUS BLD VENIPUNCTURE: CPT

## 2017-11-29 PROCEDURE — 85730 THROMBOPLASTIN TIME PARTIAL: CPT

## 2017-11-29 PROCEDURE — 84484 ASSAY OF TROPONIN QUANT: CPT

## 2017-11-29 PROCEDURE — 85025 COMPLETE CBC W/AUTO DIFF WBC: CPT

## 2017-11-29 PROCEDURE — 99285 EMERGENCY DEPT VISIT HI MDM: CPT

## 2017-11-29 RX ORDER — SODIUM CHLORIDE 9 MG/ML
1000 INJECTION INTRAMUSCULAR; INTRAVENOUS; SUBCUTANEOUS ONCE
Qty: 0 | Refills: 0 | Status: COMPLETED | OUTPATIENT
Start: 2017-11-29 | End: 2017-11-29

## 2017-11-29 RX ADMIN — SODIUM CHLORIDE 1000 MILLILITER(S): 9 INJECTION INTRAMUSCULAR; INTRAVENOUS; SUBCUTANEOUS at 14:38

## 2017-11-29 NOTE — ED ADULT NURSE NOTE - CHPI ED SYMPTOMS NEG
no weakness/no chills/no nausea/no tingling/no decreased eating/drinking/no fever/no numbness/no pain/no vomiting/no dizziness

## 2017-11-29 NOTE — ED PROVIDER NOTE - OBJECTIVE STATEMENT
Patient with syncope while at ENT's office. discussed with Dr. Martinez, report she palpated his lymph nodes and then sprayed into his nose before intended scope, patient suddenly syncopized, did not fall to ground as she helped him. no head trauma, took about a minute to return to consciousness then appeared ashen and unwell. sent to ER. patient denies CP, no palpitations no SOB no numbness or focal weakness, feels back at baseline, Patient wants to go home

## 2017-11-29 NOTE — ED ADULT NURSE NOTE - OBJECTIVE STATEMENT
PT BIBA for syncopal episode at PCP office. Pt has hx of throat CA,  was at MD office for checkup. Per PT, MD sprayed medication into his mouth then he syncopized in stretcher.  PT denies trauma.  Pt currently denies dizziness, headache, nausea, vomiting, chest pain, SOB or abd pain, denies /GI symptoms. Pt is alert and oriented x3.  Positive PMS x4 extremities.  Pt had recent PEG tube removal.  Pt denies fever, chillls.

## 2017-11-29 NOTE — ED PROVIDER NOTE - SKIN, MLM
Skin normal color for race, warm, dry and intact exception, radiation skin changes at neck. No evidence of rash.

## 2017-11-29 NOTE — ED ADULT TRIAGE NOTE - CHIEF COMPLAINT QUOTE
patient BIBA from doctors office. on radiation for throat CA and while MD was palpating neck patient had a syncopal episode. patient also with EKG changes. reports chest pain last week which resolved. denies chest pain at this time.

## 2017-11-29 NOTE — ED PROVIDER NOTE - MEDICAL DECISION MAKING DETAILS
suspect pt with vaso vagal event after neck palpation, no evidence for dysrthmia, no metabolic abnormalities, pt ok rhythm on monitor here for several hrs, pt stable for discharge, advised more conservative observation on telemetry and second trop to pt but uninterested, understands risk , encouraged cardiology followup.

## 2018-03-22 ENCOUNTER — APPOINTMENT (OUTPATIENT)
Dept: OTOLARYNGOLOGY | Facility: CLINIC | Age: 63
End: 2018-03-22
Payer: COMMERCIAL

## 2018-03-22 VITALS
HEART RATE: 89 BPM | DIASTOLIC BLOOD PRESSURE: 80 MMHG | SYSTOLIC BLOOD PRESSURE: 114 MMHG | HEIGHT: 69 IN | BODY MASS INDEX: 19.85 KG/M2 | WEIGHT: 134 LBS

## 2018-03-22 DIAGNOSIS — Z87.09 PERSONAL HISTORY OF OTHER DISEASES OF THE RESPIRATORY SYSTEM: ICD-10-CM

## 2018-03-22 PROCEDURE — 99214 OFFICE O/P EST MOD 30 MIN: CPT | Mod: 25

## 2018-03-22 PROCEDURE — 31575 DIAGNOSTIC LARYNGOSCOPY: CPT

## 2018-03-22 RX ORDER — ACETAMINOPHEN AND CODEINE 300; 30 MG/1; MG/1
300-30 TABLET ORAL
Qty: 10 | Refills: 0 | Status: DISCONTINUED | COMMUNITY
Start: 2017-08-09 | End: 2017-12-22

## 2018-04-02 ENCOUNTER — APPOINTMENT (OUTPATIENT)
Dept: OTOLARYNGOLOGY | Facility: HOSPITAL | Age: 63
End: 2018-04-02

## 2018-05-11 VITALS
TEMPERATURE: 97 F | DIASTOLIC BLOOD PRESSURE: 79 MMHG | WEIGHT: 131.4 LBS | RESPIRATION RATE: 18 BRPM | HEART RATE: 81 BPM | SYSTOLIC BLOOD PRESSURE: 158 MMHG | OXYGEN SATURATION: 100 % | HEIGHT: 68 IN

## 2018-05-11 NOTE — ASU PATIENT PROFILE, ADULT - PMH
COPD (chronic obstructive pulmonary disease)    HLD (hyperlipidemia)    Hypothyroidism    Larynx cancer  s/p RT, chemo

## 2018-05-11 NOTE — ASU PATIENT PROFILE, ADULT - PSH
History of tonsillectomy History of tonsillectomy    Surgery, elective  gastrostomy tube 2017 x 6 months  Surgery, elective  "throat surgery" by Dr. Martinez 2017

## 2018-05-14 ENCOUNTER — OUTPATIENT (OUTPATIENT)
Dept: OUTPATIENT SERVICES | Facility: HOSPITAL | Age: 63
LOS: 1 days | Discharge: HOME | End: 2018-05-14
Payer: COMMERCIAL

## 2018-05-14 ENCOUNTER — RESULT REVIEW (OUTPATIENT)
Age: 63
End: 2018-05-14

## 2018-05-14 ENCOUNTER — APPOINTMENT (OUTPATIENT)
Dept: OTOLARYNGOLOGY | Facility: HOSPITAL | Age: 63
End: 2018-05-14
Payer: COMMERCIAL

## 2018-05-14 VITALS
HEART RATE: 72 BPM | OXYGEN SATURATION: 99 % | RESPIRATION RATE: 14 BRPM | DIASTOLIC BLOOD PRESSURE: 66 MMHG | SYSTOLIC BLOOD PRESSURE: 110 MMHG | TEMPERATURE: 98 F

## 2018-05-14 DIAGNOSIS — Z90.89 ACQUIRED ABSENCE OF OTHER ORGANS: Chronic | ICD-10-CM

## 2018-05-14 DIAGNOSIS — Z41.9 ENCOUNTER FOR PROCEDURE FOR PURPOSES OTHER THAN REMEDYING HEALTH STATE, UNSPECIFIED: Chronic | ICD-10-CM

## 2018-05-14 PROBLEM — E78.5 HYPERLIPIDEMIA, UNSPECIFIED: Chronic | Status: ACTIVE | Noted: 2018-05-11

## 2018-05-14 PROBLEM — C32.9 MALIGNANT NEOPLASM OF LARYNX, UNSPECIFIED: Chronic | Status: ACTIVE | Noted: 2018-05-11

## 2018-05-14 PROBLEM — E03.9 HYPOTHYROIDISM, UNSPECIFIED: Chronic | Status: ACTIVE | Noted: 2018-05-11

## 2018-05-14 PROBLEM — J44.9 CHRONIC OBSTRUCTIVE PULMONARY DISEASE, UNSPECIFIED: Chronic | Status: ACTIVE | Noted: 2018-05-11

## 2018-05-14 PROCEDURE — 12041 INTMD RPR N-HF/GENIT 2.5CM/<: CPT | Mod: 59

## 2018-05-14 PROCEDURE — 12041 INTMD RPR N-HF/GENIT 2.5CM/<: CPT | Mod: XS

## 2018-05-14 PROCEDURE — 11402 EXC TR-EXT B9+MARG 1.1-2 CM: CPT

## 2018-05-14 PROCEDURE — 11422 EXC H-F-NK-SP B9+MARG 1.1-2: CPT

## 2018-05-14 PROCEDURE — 88304 TISSUE EXAM BY PATHOLOGIST: CPT

## 2018-05-14 PROCEDURE — 31526 DX LARYNGOSCOPY W/OPER SCOPE: CPT

## 2018-05-14 RX ORDER — OXYCODONE AND ACETAMINOPHEN 5; 325 MG/1; MG/1
1 TABLET ORAL ONCE
Qty: 0 | Refills: 0 | Status: DISCONTINUED | OUTPATIENT
Start: 2018-05-14 | End: 2018-05-14

## 2018-05-14 RX ORDER — ONDANSETRON 8 MG/1
4 TABLET, FILM COATED ORAL ONCE
Qty: 0 | Refills: 0 | Status: DISCONTINUED | OUTPATIENT
Start: 2018-05-14 | End: 2018-05-14

## 2018-05-14 RX ORDER — SODIUM CHLORIDE 9 MG/ML
1000 INJECTION, SOLUTION INTRAVENOUS
Qty: 0 | Refills: 0 | Status: DISCONTINUED | OUTPATIENT
Start: 2018-05-14 | End: 2018-05-14

## 2018-05-14 RX ORDER — MORPHINE SULFATE 50 MG/1
4 CAPSULE, EXTENDED RELEASE ORAL ONCE
Qty: 0 | Refills: 0 | Status: DISCONTINUED | OUTPATIENT
Start: 2018-05-14 | End: 2018-05-14

## 2018-05-14 RX ADMIN — MORPHINE SULFATE 4 MILLIGRAM(S): 50 CAPSULE, EXTENDED RELEASE ORAL at 11:18

## 2018-05-14 RX ADMIN — MORPHINE SULFATE 4 MILLIGRAM(S): 50 CAPSULE, EXTENDED RELEASE ORAL at 10:48

## 2018-05-14 NOTE — BRIEF OPERATIVE NOTE - OPERATION/FINDINGS
edematous supraglottis bilaterally. symmetric. No supraglottic lesion seen. L BOT with mild firmness identified as papilla likely inflammed 2/2 radiation changes. excision of sebaceous cyst without complication 1.) edematous supraglottis bilaterally. symmetric. No supraglottic lesion seen.   Left BOT with mild firmness identified as circumvallate papilla, likely inflamed due to radiation changes.   2.) excision of sebaceous cyst right upper neck just inferior to ramus -- simple closure of 2 cm defect.

## 2018-05-14 NOTE — BRIEF OPERATIVE NOTE - PRE-OP DX
Larynx cancer  05/14/2018    Active  Huey Aguilar  Sebaceous cyst  05/14/2018    Active  Huey Aguilar Larynx cancer  05/14/2018  supraglottic, s/p RT/chemo  Active  Sissy Martinez  Sebaceous cyst  05/14/2018  right upper neck  Active  Sissy Martinez

## 2018-05-14 NOTE — BRIEF OPERATIVE NOTE - PROCEDURE
<<-----Click on this checkbox to enter Procedure Laryngoscopy  05/14/2018    Active  [id number]  Excision of cyst of head or neck  05/14/2018    Active  [id number] Laryngoscopy  05/14/2018  micro  Active  Huey Aguilar  Excision of cyst of head or neck  05/14/2018  sebaceous cyst right upper neck with simple repair defect  Active  Huey Aguilar

## 2018-05-21 LAB — SURGICAL PATHOLOGY STUDY: SIGNIFICANT CHANGE UP

## 2018-06-20 ENCOUNTER — APPOINTMENT (OUTPATIENT)
Dept: OTOLARYNGOLOGY | Facility: CLINIC | Age: 63
End: 2018-06-20

## 2018-07-28 PROBLEM — Z78.9 ALCOHOL USE: Status: ACTIVE | Noted: 2017-09-06

## 2018-08-17 ENCOUNTER — APPOINTMENT (OUTPATIENT)
Dept: OTOLARYNGOLOGY | Facility: CLINIC | Age: 63
End: 2018-08-17
Payer: COMMERCIAL

## 2018-08-17 VITALS
BODY MASS INDEX: 19.7 KG/M2 | HEART RATE: 84 BPM | HEIGHT: 69 IN | WEIGHT: 133 LBS | SYSTOLIC BLOOD PRESSURE: 120 MMHG | DIASTOLIC BLOOD PRESSURE: 75 MMHG

## 2018-08-17 DIAGNOSIS — R49.8 OTHER VOICE AND RESONANCE DISORDERS: ICD-10-CM

## 2018-08-17 DIAGNOSIS — R91.1 SOLITARY PULMONARY NODULE: ICD-10-CM

## 2018-08-17 DIAGNOSIS — C77.0 SECONDARY AND UNSPECIFIED MALIGNANT NEOPLASM OF LYMPH NODES OF HEAD, FACE AND NECK: ICD-10-CM

## 2018-08-17 DIAGNOSIS — J38.01 PARALYSIS OF VOCAL CORDS AND LARYNX, UNILATERAL: ICD-10-CM

## 2018-08-17 DIAGNOSIS — Z87.898 PERSONAL HISTORY OF OTHER SPECIFIED CONDITIONS: ICD-10-CM

## 2018-08-17 DIAGNOSIS — C32.9 MALIGNANT NEOPLASM OF LARYNX, UNSPECIFIED: ICD-10-CM

## 2018-08-17 PROCEDURE — 31575 DIAGNOSTIC LARYNGOSCOPY: CPT

## 2018-08-17 PROCEDURE — 99213 OFFICE O/P EST LOW 20 MIN: CPT | Mod: 25

## 2018-08-20 RX ORDER — MEGESTROL ACETATE 40 MG/ML
40 SUSPENSION ORAL
Qty: 480 | Refills: 0 | Status: ACTIVE | COMMUNITY
Start: 2018-07-26

## 2018-08-20 RX ORDER — LEVOTHYROXINE SODIUM 0.07 MG/1
75 TABLET ORAL
Qty: 90 | Refills: 0 | Status: ACTIVE | COMMUNITY
Start: 2018-07-25

## 2018-09-05 PROBLEM — R91.1 LUNG NODULE, SOLITARY: Status: ACTIVE | Noted: 2018-09-05

## 2018-09-05 PROBLEM — Z87.898 HISTORY OF SYNCOPE: Status: RESOLVED | Noted: 2018-01-08 | Resolved: 2018-09-05

## 2018-09-05 RX ORDER — MULTIVITAMIN WITH FOLIC ACID 400 MCG
TABLET ORAL
Qty: 30 | Refills: 0 | Status: DISCONTINUED | COMMUNITY
Start: 2017-08-28 | End: 2018-08-17

## 2018-09-05 RX ORDER — ASPIRIN 81 MG/1
81 TABLET ORAL
Qty: 30 | Refills: 0 | Status: DISCONTINUED | COMMUNITY
Start: 2018-04-15 | End: 2018-08-17

## 2018-09-05 RX ORDER — L ACID,RHAMN/B.INFANTIS,LONGUM 2B CELL
100 CAPSULE, SPRINKLE ORAL
Qty: 30 | Refills: 0 | Status: DISCONTINUED | COMMUNITY
Start: 2017-08-28 | End: 2018-08-17

## 2018-09-05 RX ORDER — NICOTINE 21 MG/24HR
21 PATCH, TRANSDERMAL 24 HOURS TRANSDERMAL
Qty: 28 | Refills: 0 | Status: DISCONTINUED | COMMUNITY
Start: 2017-08-28 | End: 2018-08-17

## 2018-09-05 RX ORDER — FOLIC ACID 1 MG/1
1 TABLET ORAL
Qty: 30 | Refills: 0 | Status: DISCONTINUED | COMMUNITY
Start: 2017-08-28 | End: 2018-08-17

## 2018-09-05 RX ORDER — ALBUTEROL SULFATE 90 UG/1
108 (90 BASE) AEROSOL, METERED RESPIRATORY (INHALATION)
Qty: 8 | Refills: 0 | Status: DISCONTINUED | COMMUNITY
Start: 2018-02-17 | End: 2018-08-17

## 2018-09-05 RX ORDER — LEVOTHYROXINE SODIUM 0.05 MG/1
50 TABLET ORAL
Qty: 30 | Refills: 0 | Status: DISCONTINUED | COMMUNITY
Start: 2018-03-30 | End: 2018-06-25

## 2018-09-05 NOTE — ASSESSMENT
[FreeTextEntry1] : Mr. 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 Gondal and colonoscopy\par \par Return in 2 months

## 2018-09-05 NOTE — HISTORY OF PRESENT ILLNESS
[de-identified] : Mr. Larose was seen in office for f/up of supraglottic larynx cancer\par He has mild dry throat.  No throat pain.  Voice is oK.\par s/p radiation (completed 11/24/17) and chemotherapy (x 3; completed 11/08/2017) for T4N3M0 stage IV SCCa SGL\par He is to get colonoscopy because a "blood test was 9.2 instead of 2.6" (CEA?)\par He may have had a PET-CT scan in  this month - Dr. Gondal reported to him that everything was fine.\par \par \par CT CHEST  (8-) at [facility] in [place]\par - New 7 mm mildly irregular nodule in medial right middle lobe of uncertain etiology\par - mild increase in left adrenal gland\par - emphysema, airway inflammation\par \par \par PRIOR HX:\par S/P admission to [facility] for dysphagia to liquids on 8/24/17. \par On FOL, left true vocal cord paralysis was noted, along with supraglottic and possible piriform sinus tumor.\par Placed on DT prophylaxis (Ativan) on recommendation by Med consult. Given one dose.\par On 8/25/2017 -- PEG placement by Dr. Snyder\par On 8/25/2017 -- CT Neck with contrast done. \par On 8/25/2017 -- microlaryngoscopy and biopsy of left supraglottic larynx to map tumor and debulk to open airway. Found to have exophytic and infiltrative tumor involving all left FVF, portion laryngeal epiglottis, portion left AE fold and filling laryngeal ventricle. Tumor adjacent to left TVF length and anterior commissure. No involvement of piriform sinus.\par \par PATHOLOGY (8/25/2017):\par Left false vocal cord and ventricles biopsies -- Invasive moderately differentiated keratinizing SCC. \par \par CT NECK noncontrast (3/21/2018) at [facility]:\par -- limited study without contrast\par - thickening of skin and platysma, reticulation of subcutaneous fat, edema and inflammatory changes within the subq and deep cervical soft tissues, retropharyngeal space and pharyngeal walls. \par - Increased thickening and edema of epiglottis, preepiglottic fat, AE folds and laryngeal structures; greater on left, without focal masses\par - No enlarged or necrotic lymph nodes. Previously identified LN in left jugular chain is now 0.5 x 0.5 cm.\par \par CT NECK with contrast (8/25/2017) at [facility]\par -- approximately 3.2 cm x 1.5 cm wide x 2.5 cm AP enhancing supraglottic hypopharyngeal mass lesion with central air that may represent necrosis. Superiorly there is abnormal soft tissue within the preepiglottic fat at the left base of the epiglottis. Anteriorly this mass lesion abuts the left medial posterior strap muscle. Abnormal enhancing soft tissue that involves the left aryepiglottic fold. The bulk of the mass is causing near complete obliteration of the left piriform sinuses. -- 1 cm x 0.5 cm focus of air in the left juxta- and infrahyoid supraglottic soft tissues abutting the medial posterior \par aspect of the left strap muscle. This lesion contains a tiny air-fluid level and may represent the patient's previous biopsy site versus necrosis versus infection. \par -- Abnormal enhancing soft tissue effacing the paraglottic fat and extending to the level of the left vocal cord. This nodular soft tissue abuts the superior surface of the left vocal cord. Inferiorly and anteriorly there is abnormal enhancing soft tissue abutting the anterior commissure and mildly crossing the midline. \par -- Laterally this tumor abuts the left thyroid cartilage. The left thyroid cartilage demonstrates dehiscence likely due to erosive changes. There is slight bulging of the left thyroid cartilage with haziness of the left lateral cortex of the thyroid cartilage.\par -- There is a small 1 cm contiguous nodular focus of enhancement (axial image #54 series 3) along the lateral pyriform sinus that is in close proximity to the medial wall of the left carotid artery (axial image #53 and 54). \par -- Posteriorly there is enhancing soft tissue extending through the thyroarytenoid junction (axial image #57 series 3). \par -- Large left level 3 lymph node with central necrosis measuring approximately 1.7 cm x 1.3 cm x 3.3 cm craniocaudal. There are 2 small subcentimeter level 2 lymph nodes (axial image #39). There is a 0.5 cm left level 6 lymph node (axial image #65 series 7. \par -- On the right, 2 subcentimeter level 2 and 3 lymph nodes (axial image #38 and 43 series 3).\par -- The bilateral parotid and submandibular and thyroid glands are within normal limits.\par -- The bilateral lung apices are within normal limits. Evaluation of the osseous structures demonstrates no evidence of bone lesions or fractures.\par Comparison with MRI images confirms complete right thyroid cartilage invasion by the cancer.\par \par PET-CT (8/22/2017) outside scan:\par -Hypermetabolic left piriform sinus mass and left cervical lymphadenopathy, likely corresponding to the known malignancy. Increased activity associated with subcentimeter bilateral cervical lymph nodes, left posterior nasopharynx and left palatine tonsils, suggesting additional disease\par - Focal increased activity localizing to the spleen, likely representing misregistration of adjacent colonic activity due to motion artifact. Dedicated MRI may be considered for confirmation.\par - Indeterminate 16 mm left adrenal nodule, can be evaluated on MRI\par \par MRI NECK with and without contrast (8/17/2017) outside:\par - Irregular, lobulated enhancing mass at level of left pyriform sinus noted. Mass extends to left aryepiglottic fold. Inferior extension to the left vocal cord is noted. Mass measures approx 2.4 x 1.3 x 2.1 cm. \par - Pathologic adenopathy in left neck, deep to the sternocleidomastoid muscle. LN measures 2.89 x 1.4 x 2.5 cm. Additional matted necrotic adenopathy posterior to the left submandibular gland is noted, which is anteriorly displaced. \par - left thyroid cartilage involvement with the SCCa, not just inner table erosion. \par  \par

## 2018-09-05 NOTE — PHYSICAL EXAM
[Midline] : trachea located in midline position [Laryngoscopy Performed] : laryngoscopy was performed, see procedure section for findings [Normal] : no neck adenopathy [FreeTextEntry1] : slightly raspy voice as before. [de-identified] : Mild soft tissue edema upper neck bilaterally, soft, no mass. [de-identified] : Slightly dry mouth.

## 2018-09-05 NOTE — PROCEDURE
[de-identified] : \par Indication:  larynx cancer\par -Verbal consent was obtained from patient prior to procedure.\par -Etja-Synephrine and lidocaine 2% spray applied to the nasal cavities.\par Flexible laryngoscopy was performed via left nostril with flex scope:\par   -- Nasopharynx had no mass or exudate.  s/p adenoidectomy\par   -- Base of tongue was symmetric and not enlarged.\par   -- Vallecula was clear\par   -- Epiglottis, both aryepiglottic folds and both false vocal folds had mild-moderate edema\par   -- Arytenoids both with edema and erythema \par   -- True vocal fold on right was fully mobile; on left, minimal movement \par   -- Post cricoid area was clear.\par   -- Interarytenoid edema was present.\par \par The patient tolerated the procedure well.\par \par

## 2018-09-05 NOTE — CONSULT LETTER
[Dear  ___] : Dear  [unfilled], [Courtesy Letter:] : I had the pleasure of seeing your patient, [unfilled], in my office today. [Consult Closing:] : Thank you very much for allowing me to participate in the care of this patient.  If you have any questions, please do not hesitate to contact me. [Sincerely,] : Sincerely, [DrFior  ___] : Dr. MALDONADO [DrFior ___] : Dr. MALDONADO [FreeTextEntry2] : Ambrocio Zacarias M.D.\[address]\[place] [address] [FreeTextEntry1] : \par \par \par Enclosed please find my office notes for August 17, 2018. \par \par \par  [FreeTextEntry3] : \par Sissy Martinez MD \par Otolaryngology, Head and Neck Surgery \par Residency site , [facility]\par

## 2018-09-30 NOTE — ASU PATIENT PROFILE, ADULT - TOBACCO USE
Normal rate, regular rhythm.  Heart sounds S1, S2.  No murmurs, rubs or gallops.
Current some day smoker

## 2018-10-19 ENCOUNTER — APPOINTMENT (OUTPATIENT)
Dept: OTOLARYNGOLOGY | Facility: CLINIC | Age: 63
End: 2018-10-19

## 2020-03-24 NOTE — PATIENT PROFILE ADULT. - NSTOBACCONEVERSMOKERY/N_GEN_A
Vaccine Information Statement(s) given, reviewed, questions answered, and verbal consent given by Patient for injection(s) and administration of Pneumococcal Polysaccharide (PPSV).  Patient tolerated well.   
Yes, Non-Core measure site...

## 2020-12-10 NOTE — ASU PATIENT PROFILE, ADULT - MEDICATIONS BROUGHT TO HOSPITAL, PROFILE
[FreeTextEntry1] : This is a 60-year-old  male with a several year history of slow force of stream, hesitancy and nocturia x2-3.  The patient denies dysuria, hematuria or history of urinary tract infections.  The patient's PSA in 2019 was 2.5 max milliliter and repeat in April of this year was 3.0.  A subsequent PSA on November 19, 2020 was elevated to 4.3 ng/mL and the patient was advised to undergo MRI; however he had a repeat study which was down to 3.7 times milliliter and a further PSA after a course of Augmentin was 3.0 ng/mL.  The patient had a 4K score yesterday, results of which are pending.  He denies family history of prostate cancer (his mother did have breast cancer).  The patient was prescribed tamsulosin but has not been taking this regularly.  He does have a history of erectile dysfunction which which is responding well to tadalafil, 20 mg as needed. no

## 2023-01-01 ENCOUNTER — HOME CARE VISIT (OUTPATIENT)
Dept: HOME HEALTH SERVICES | Facility: HOME HEALTHCARE | Age: 68
End: 2023-01-01

## 2023-01-01 ENCOUNTER — TRANSCRIBE ORDERS (OUTPATIENT)
Dept: HOME HEALTH SERVICES | Facility: HOME HEALTHCARE | Age: 68
End: 2023-01-01

## 2023-01-01 ENCOUNTER — HOME CARE VISIT (OUTPATIENT)
Dept: HOME HOSPICE | Facility: HOSPICE | Age: 68
End: 2023-01-01

## 2023-01-01 ENCOUNTER — HOSPICE ADMISSION (OUTPATIENT)
Dept: HOME HOSPICE | Facility: HOSPICE | Age: 68
End: 2023-01-01

## 2023-01-01 VITALS
DIASTOLIC BLOOD PRESSURE: 46 MMHG | RESPIRATION RATE: 20 BRPM | TEMPERATURE: 97.3 F | HEART RATE: 101 BPM | SYSTOLIC BLOOD PRESSURE: 90 MMHG

## 2023-01-01 VITALS
HEART RATE: 76 BPM | DIASTOLIC BLOOD PRESSURE: 54 MMHG | TEMPERATURE: 98.1 F | RESPIRATION RATE: 18 BRPM | SYSTOLIC BLOOD PRESSURE: 113 MMHG

## 2023-01-01 DIAGNOSIS — J96.01 ACUTE RESPIRATORY FAILURE WITH HYPOXIA (HCC): Primary | ICD-10-CM

## 2023-01-01 DIAGNOSIS — Z51.5 HOSPICE CARE PATIENT: Primary | ICD-10-CM

## 2023-02-02 ENCOUNTER — HOME CARE VISIT (OUTPATIENT)
Dept: HOME HOSPICE | Facility: HOSPICE | Age: 68
End: 2023-02-02

## 2025-05-12 NOTE — PATIENT PROFILE ADULT. - FALL HARM RISK TYPE OF ASSESSMENT
On initial CMP CO2 22, VBG HCO3 20, now 17  Lactic < 0.2  Likely in the setting of acute overdose    Plan:  Give 1L bolus IVF  Repeat CMP on arrival to ICU  Trend VBG, BMP   Daily Assessment